# Patient Record
Sex: FEMALE | Race: OTHER | Employment: UNEMPLOYED | URBAN - METROPOLITAN AREA
[De-identification: names, ages, dates, MRNs, and addresses within clinical notes are randomized per-mention and may not be internally consistent; named-entity substitution may affect disease eponyms.]

---

## 2017-11-15 ENCOUNTER — HOSPITAL ENCOUNTER (EMERGENCY)
Age: 42
Discharge: HOME | End: 2017-11-15
Attending: EMERGENCY MEDICINE | Admitting: EMERGENCY MEDICINE
Payer: SELF-PAY

## 2017-11-15 ENCOUNTER — APPOINTMENT (OUTPATIENT)
Dept: GENERAL RADIOLOGY | Age: 42
End: 2017-11-15
Attending: PHYSICIAN ASSISTANT
Payer: SELF-PAY

## 2017-11-15 VITALS
HEART RATE: 87 BPM | WEIGHT: 200 LBS | OXYGEN SATURATION: 98 % | BODY MASS INDEX: 34.15 KG/M2 | SYSTOLIC BLOOD PRESSURE: 110 MMHG | DIASTOLIC BLOOD PRESSURE: 70 MMHG | HEIGHT: 64 IN | RESPIRATION RATE: 18 BRPM | TEMPERATURE: 97.7 F

## 2017-11-15 DIAGNOSIS — J02.9 SORE THROAT: ICD-10-CM

## 2017-11-15 DIAGNOSIS — R05.9 COUGH: Primary | ICD-10-CM

## 2017-11-15 DIAGNOSIS — R07.9 CHEST PAIN, UNSPECIFIED TYPE: ICD-10-CM

## 2017-11-15 LAB
D DIMER PPP FEU-MCNC: 0.47 UG/ML(FEU)
TROPONIN I BLD-MCNC: <0.04 NG/ML (ref 0–0.08)

## 2017-11-15 PROCEDURE — 93005 ELECTROCARDIOGRAM TRACING: CPT

## 2017-11-15 PROCEDURE — 87081 CULTURE SCREEN ONLY: CPT | Performed by: PHYSICIAN ASSISTANT

## 2017-11-15 PROCEDURE — 93971 EXTREMITY STUDY: CPT

## 2017-11-15 PROCEDURE — 99284 EMERGENCY DEPT VISIT MOD MDM: CPT

## 2017-11-15 PROCEDURE — 85379 FIBRIN DEGRADATION QUANT: CPT | Performed by: PHYSICIAN ASSISTANT

## 2017-11-15 PROCEDURE — 71020 XR CHEST PA LAT: CPT

## 2017-11-15 PROCEDURE — 84484 ASSAY OF TROPONIN QUANT: CPT

## 2017-11-15 PROCEDURE — 74011250637 HC RX REV CODE- 250/637: Performed by: PHYSICIAN ASSISTANT

## 2017-11-15 RX ORDER — BENZONATATE 100 MG/1
100 CAPSULE ORAL
Qty: 6 CAP | Refills: 0 | Status: SHIPPED | OUTPATIENT
Start: 2017-11-15 | End: 2017-11-18

## 2017-11-15 RX ORDER — ACETAMINOPHEN 325 MG/1
650 TABLET ORAL
Status: COMPLETED | OUTPATIENT
Start: 2017-11-15 | End: 2017-11-15

## 2017-11-15 RX ADMIN — ACETAMINOPHEN 650 MG: 325 TABLET, FILM COATED ORAL at 16:10

## 2017-11-15 NOTE — ED PROVIDER NOTES
HPI Comments: Marla Christopher is a 38 y.o. Female who speaks [place] as her first language and some English presents to the ED c/o sore throat x 4 days. Pt states the pain is worse with swallowing and worse on the right side. She has taken ibuprofen for the pain. Pt also c/o intermittent cough x 2 weeks. She is also c/o intermittent left sided chest pain x 2 weeks that she gets when she is worried and she has recently been worried about her son; states she last had this pain at 10:00. She states it feels like a \"burning\" and is similar to a pain she had 3 years ago that was evaluated via endoscopy. Pt denies radiation of the pain into the neck or the arm. Pt states she recently saw a Dr. Marilynn Jarrett because she was told he speaks Arabic regarding symptoms and she was told she had three blood clots in her right leg. She denies being started on blood thinners, stating he said she was very anxious and prescribed her medication for anxiety. No hormone use, immobilization, hemoptysis. Pt also reports intermittent frontal headaches, stating she currently has one. It came on gradually and is not the worse headache of her life. The language line was used for obtain history and exam.    Patient is a 38 y.o. female presenting with sore throat and cough. The history is provided by the patient. The history is limited by a language barrier. A  was used. Sore Throat    This is a new problem. The current episode started more than 2 days ago. The problem has not changed since onset. There has been no fever. Associated symptoms include ear pain, headaches and cough. Pertinent negatives include no vomiting. She has tried nothing for the symptoms. Cough   Associated symptoms include chest pain, ear pain, headaches and sore throat. Pertinent negatives include no chills and no vomiting. History reviewed. No pertinent past medical history. History reviewed. No pertinent surgical history. History reviewed. No pertinent family history. Social History     Social History    Marital status: SINGLE     Spouse name: N/A    Number of children: N/A    Years of education: N/A     Occupational History    Not on file. Social History Main Topics    Smoking status: Never Smoker    Smokeless tobacco: Never Used    Alcohol use No    Drug use: No    Sexual activity: Not on file     Other Topics Concern    Not on file     Social History Narrative         ALLERGIES: Review of patient's allergies indicates no known allergies. Review of Systems   Constitutional: Negative for chills and fever. HENT: Positive for ear pain and sore throat. Respiratory: Positive for cough. Cardiovascular: Positive for chest pain. Gastrointestinal: Negative for abdominal pain and vomiting. Genitourinary: Negative for dysuria. Skin: Negative for rash. Neurological: Positive for headaches. Vitals:    11/15/17 1349   BP: 114/70   Pulse: 86   Resp: 18   Temp: 99 °F (37.2 °C)   SpO2: 99%   Weight: 90.7 kg (200 lb)   Height: 5' 4\" (1.626 m)            Physical Exam   Constitutional: She is oriented to person, place, and time. She appears well-developed and well-nourished. No distress. HENT:   Head: Normocephalic and atraumatic. Right Ear: External ear normal.   Left Ear: External ear normal.   Nose: Nose normal.   Mouth/Throat: Oropharynx is clear and moist. No oropharyngeal exudate. Erythema noted to the pharynx. No exudates appreciated. Uvula is midline, airway is patent. Bilateral TMs with good bony landmarks, no erythema or bulging appreciated. Eyes: Conjunctivae are normal.   Neck: Normal range of motion. Neck supple. Cardiovascular: Normal rate, regular rhythm and normal heart sounds. Pulmonary/Chest: Effort normal and breath sounds normal. No respiratory distress. She has no wheezes. She has no rales. Musculoskeletal:   LLE: mild calf ttp. No edema or skin changes noted.  Good DP pulse.  RLE: mild anterior shin ttp, no calf ttp. No edema or skin changes. Good DP pulse. BLE are symmetric in size. Lymphadenopathy:     She has no cervical adenopathy. Neurological: She is alert and oriented to person, place, and time. Skin: Skin is warm and dry. She is not diaphoretic. Psychiatric: She has a normal mood and affect. Nursing note and vitals reviewed. MDM  Number of Diagnoses or Management Options  Chest pain, unspecified type:   Cough:   Sore throat:   Diagnosis management comments: Differential Diagnosis: Pneumonia, pulmonary embolism, chest wall pain, angina, AMI, pleurisy, pericarditis, anxiety, viral URI, pharyngitis, strep pharyngitis    Pt c/o intermittent CP, last had at 10am today, described as a burning sensation that occurs when she is worried. No radiation of pain. It sounds as though it has previously been worked up by GI with endo. Does not sound cardiac in nature, trop is negative and onset was greater than 6 hours ago, therefore a second is not necessary. EKG NSR, no STEMI. CXR negative for acute cardiopulmonary process. Pt is reporting she was told 2 weeks ago she had blood clots in her legs by a Dr. Sohan Her. I am unable to see any records on this through her chart or CareEverywhere. She was not placed on thinners but rather prescribed anxiety medication. Clinically she does not have signs of a DVT, however, PVL ordered as the story does not seem to be adding up. With questionable report of DVT (PVL pending) and CP, d-dimer was ordered. Slightly elevated above our limits at 0.47, however, less than 0.5 is negative. Well's Criteria for PE score puts patient at a low pre-test probability. The only factor contributing to score is report of DVT which at this time is not confirmed. 5:10 PM Pt reassessed, she is resting comfortably. States headache has resolved with Tylenol. Informed we will be getting PVL.  She states she has never had imaging of her LE prior to being told she had blood clots. 6:24 PM Pt reassessed, she is resting comfortably on cell phone. Still waiting for PVL. [place] spoke with Marielena Renee, vascular tech, states she should be in soon to do PVL. 6:27 PM tech is here to take patient for PVL. 7:00 PM Pt returned from PVL study. Negative per tech. Given this information in light of the rest of her story, feel patient is stable for discharge with outpatient PCP follow up and cardiology evaluation for chest pain. Suspect patient has a URI explaining pharyngitis and cough. Will rx tessalon perles for cough and symptomatic control at this point.         Amount and/or Complexity of Data Reviewed  Clinical lab tests: ordered and reviewed  Tests in the radiology section of CPT®: ordered and reviewed      ED Course     RESULTS:    DUPLEX LOWER EXT VENOUS RIGHT         XR CHEST PA LAT   Final Result          Labs Reviewed   D DIMER - Abnormal; Notable for the following:        Result Value    D DIMER 0.47 (*)     All other components within normal limits   STREP THROAT SCREEN   POC TROPONIN-I   POC TROPONIN-I       Recent Results (from the past 12 hour(s))   EKG, 12 LEAD, INITIAL    Collection Time: 11/15/17  2:56 PM   Result Value Ref Range    Ventricular Rate 83 BPM    Atrial Rate 83 BPM    P-R Interval 176 ms    QRS Duration 82 ms    Q-T Interval 368 ms    QTC Calculation (Bezet) 432 ms    Calculated P Axis 13 degrees    Calculated R Axis -2 degrees    Calculated T Axis 46 degrees    Diagnosis       Normal sinus rhythm  Inferior infarct , age undetermined  Abnormal ECG  No previous ECGs available     STREP THROAT SCREEN    Collection Time: 11/15/17  3:00 PM   Result Value Ref Range    Special Requests: NO SPECIAL REQUESTS      Strep Screen NEGATIVE       Culture result: PENDING    D DIMER    Collection Time: 11/15/17  3:15 PM   Result Value Ref Range    D DIMER 0.47 (H) <0.46 ug/ml(FEU)   POC TROPONIN-I    Collection Time: 11/15/17  3:22 PM Result Value Ref Range    Troponin-I (POC) <0.04 0.00 - 0.08 ng/mL       Procedures  PVL study. Diagnosis:   1. Cough    2. Sore throat    3. Chest pain, unspecified type          Disposition: Discharge. Follow-up Information     Follow up With Details Comments [address] MD Schedule an appointment as soon as possible for a visit in 1 day for evaluation of cough and further evaluation chest pain [id number] [facility] 281 222 [facility] EMERGENCY DEPT  As needed, If symptoms worsen [address] [facility] [place] Harvey HARRINGTON MD Schedule an appointment as soon as possible for a visit in 1 day for further evaluation of chest pain [address]  301 [facility] [address]  [address] [facility]  [phone]            Patient's Medications   Start Taking    BENZONATATE (TESSALON PERLES) 100 MG CAPSULE    Take 1 Cap by mouth two (2) times daily as needed for Cough for up to 3 days. Continue Taking    No medications on file   These Medications have changed    No medications on file   Stop Taking    CITALOPRAM (CELEXA) 10 MG TABLET    Take 1 Tab by mouth daily. LORATADINE (CLARITIN) 10 MG TABLET    Take 1 Tab by mouth daily. OMEPRAZOLE DELAYED RELEASE (PRILOSEC D/R) 20 MG TABLET    Take 1 Tab by mouth two (2) times a day. TRIAMCINOLONE ACETONIDE (KENALOG) 0.1 % OINTMENT    Apply  to affected area two (2) times a day.  use thin layer     Landry Arnett PA-C 7:01 PM

## 2017-11-15 NOTE — DISCHARGE INSTRUCTIONS
Tos: Instrucciones de cuidado - [ Cough: Care Instructions ]  Instrucciones de cuidado    La tos es [place] de parker cuerpo a algo que molesta en la garganta o las vías respiratorias. La tos puede ser provocada por muchas cosas. Usted podría toser debido a un resfriado o nahed gripe, nahed bronquitis o el asma. Fumar, el goteo posnasal, las alergias y el ácido estomacal que regresa a parker garganta también pueden causar tos. La tos es un síntoma, no nahed enfermedad. En la [employer], la tos cesa cuando desaparece la causa, leon un resfriado. Puede jess algunas medidas en parker hogar para toser menos y sentirse mejor. La atención de seguimiento es nahed parte clave de parker tratamiento y seguridad. Asegúrese de hacer y acudir a todas las citas, y llame a parker médico si está teniendo problemas. También es nahed buena idea saber los resultados de los exámenes y mantener nahed lista de los medicamentos que dilip. ¿Cómo puede cuidarse en el hogar? · [place] abundante agua y otros líquidos. [place] ayuda a [place] mucosidad sea menos espesa y [place] la garganta seca o adolorida. La miel o el jugo de gladys en [place] o té podrían aliviar nahed tos seca. · [employer] medicamentos para la tos según las indicaciones de parker médico.  · Eleve la ashleigh sobre almohadas para ayudarle a respirar y aliviar la tos seca. · Pruebe pastillas para la tos para aliviar la garganta seca o adolorida. Las pastillas para la tos no detienen la tos. Las pastillas para la tos medicinales saborizadas no son mejores que las pastillas con sabor a [place] o los caramelos duros. · No fume. Evite el humo de tabaco ambiental. Si necesita ayuda para dejar de fumar, hable con parker médico sobre programas y medicamentos para dejar de fumar. Estos pueden aumentar rahel probabilidades de dejar el hábito para siempre. ¿Cuándo debe pedir ayuda? Llame al 911 en cualquier momento que considere que necesita atención de [place]. Por ejemplo, llame si:  ?  · Tiene graves dificultades para respirar. ? Llame a parker médico ahora mismo o busque atención médica inmediata si:  ? · Tose denys. ? · Tiene nuevas dificultades para respirar o Avanell Edman. ? · Tiene fiebre nueva o más amrita. ? · Tiene un salpullido nuevo. ?Preste especial atención a los cambios en parker robyn y asegúrese de comunicarse con parker médico si:  ? · Parker tos es más profunda o más frecuente que antes, especialmente si nota más mucosidad o un cambio en el color de la mucosidad. ? · Tiene nuevos síntomas, leon dolor de garganta, dolor de oídos o dolor en los senos paranasales. ? · No mejora leon se esperaba. ¿Dónde puede encontrar más información en inglés? Chi Safer a http://zabrina-noble.info/. Escriba D279 en la búsqueda para aprender más acerca de \"Tos: Instrucciones de cuidado - [ Cough: Care Instructions ]. \"  Revisado: 12 seymour, 2017  Versión del contenido: 11.4  © 1637-5973 Healthwise, Incorporated. Las instrucciones de cuidado fueron adaptadas bajo licencia por [employer] (which disclaims liability or warranty for this information). Si usted tiene [place] afección médica o sobre estas instrucciones, siempre pregunte a parker profesional de robyn. Healthwise, Incorporated niega toda garantía o responsabilidad por parker uso de esta información. Dolor de pecho: Instrucciones de cuidado - [ Chest Pain: Care Instructions ]  Instrucciones de cuidado    El dolor de pecho puede tener muchas causas. Algunas no son graves y mejorarán por sí solas en pocos días. Aly algunos tipos de dolor de pecho requieren más pruebas y [place]. Es posible que parker médico le haya recomendado nahed visita de seguimiento dentro de las 8 a 12 horas siguientes. Si no mejora, es posible que necesite [address] pruebas o [place]. Aunque parker médico le haya dado de amrita, es necesario que esté atento a cualquier problema que se presente.  El médico le hizo un cuidadoso [place], [place] a veces los problemas pueden aparecer posteriormente. Si tiene nuevos síntomas o éstos no mejoran, obtenga atención médica de inmediato. Si tiene dolor o presión en el pecho que empeora o es diferente y que dura más de 5 minutos, o se desmayó (perdió el conocimiento), llame al 911 o busque otra ayuda de emergencia de inmediato. Acudir a nahed consulta médica es sólo un paso en parker tratamiento. Aunque se sienta mejor, todavía deberá hacer lo que parker médico le recomiende, leon asistir a todas las visitas de seguimiento sugeridas y jess los medicamentos exactamente [employer]	[place] fueron indicados. [place] le ayudará a recuperarse y prevenir problemas futuros. ¿Cómo puede cuidarse en el hogar? · Descanse hasta que se sienta mejor. · Soham rahel medicamentos exactamente leon le fueron recetados. Llame a parker médico si michela estar teniendo problemas con parker medicamento. · No conduzca después de jess un analgésico (medicamento para el dolor) recetado. ¿Cuándo debe pedir ayuda? Llame al 911 si:  ? · Se desmayó (perdió el conocimiento). ? · Tiene graves dificultades para respirar. ? · Tiene síntomas de un ataque al corazón. Estos podrían incluir:  ¨ Dolor o presión en el pecho, o nahed sensación extraña en el pecho. ¨ Sudoración. ¨ Falta de aire. ¨ Náuseas o vómito. ¨ Dolor, presión o nahed sensación extraña en la espalda, el mayco, la mandíbula, la parte superior del abdomen o en yelena o ambos hombros o brazos. ¨ Aturdimiento o debilidad repentina. ¨ Latidos del corazón rápidos o irregulares. Después de llamar al 911, es posible que el operador le diga que mastique 1 aspirina para adultos o de 2 a 4 aspirinas de dosis baja. Espere a nahed ambulancia. No intente conducir usted mismo. ? Llame hoy a parker médico si:  ? · Tiene cualquier dificultad para respirar. ? · El dolor en el pecho empeora. ? · [employer] o aturdimiento, o que está a punto de [place]. ? · No mejora leon se esperaba.    ? · Tiene dolor de pecho nuevo o diferente. ¿Dónde puede encontrar más información en inglés? Donold Noss a http://zabrina-noble.info/. Escriba A120 en la búsqueda para aprender más acerca de \"Dolor de pecho: Instrucciones de cuidado - [ Chest Pain: Care Instructions ]. \"  Revisado: 20 [address] 2017  Versión del contenido: 11.4  © 1114-6567 Healthwise, Incorporated. Las instrucciones de cuidado fueron adaptadas bajo licencia por [employer] (which disclaims liability or warranty for this information). Si usted tiene [place] afección médica o sobre estas instrucciones, siempre pregunte a parker profesional de robyn. Healthwise, Incorporated niega toda garantía o responsabilidad por parker uso de esta información.

## 2017-11-16 LAB
ATRIAL RATE: 83 BPM
CALCULATED P AXIS, ECG09: 13 DEGREES
CALCULATED R AXIS, ECG10: -2 DEGREES
CALCULATED T AXIS, ECG11: 46 DEGREES
DIAGNOSIS, 93000: NORMAL
P-R INTERVAL, ECG05: 176 MS
Q-T INTERVAL, ECG07: 368 MS
QRS DURATION, ECG06: 82 MS
QTC CALCULATION (BEZET), ECG08: 432 MS
VENTRICULAR RATE, ECG03: 83 BPM

## 2017-11-16 NOTE — PROCEDURES
[facility]  *** FINAL REPORT ***    Name: Pat Garcia  MRN: [id number]    Inpatient  : 28 Oct 1975  HIS Order #: [id number]  [address] [facility] Visit #: [id number]  Date: 15 Nov 2017    TYPE OF TEST: Peripheral Venous Testing    REASON FOR TEST  Pain in limb    Right Leg:-  Deep venous thrombosis:           No  Superficial venous thrombosis:    No  Deep venous insufficiency:        Not examined  Superficial venous insufficiency: Not examined      INTERPRETATION/FINDINGS  Duplex images were obtained using 2-D gray scale, color flow, and  spectral Doppler analysis. Right leg :  1. Deep vein(s) visualized include the common femoral, deep femoral,  proximal femoral, mid femoral, distal femoral, popliteal(above knee),  popliteal(fossa), popliteal(below knee), posterior tibial and peroneal   veins. 2. No evidence of deep venous thrombosis detected in the veins  visualized. 3. No evidence of deep vein thrombosis in the contralateral common  femoral vein. 4. Superficial vein(s) visualized include the great saphenous and  small saphenous veins. 5. No evidence of superficial thrombosis detected. ADDITIONAL COMMENTS  Results given to [facility], [place]    I have personally reviewed the data relevant to the interpretation of  this  study.     TECHNOLOGIST: Lashanda Putnam RDMS, RVT  Signed: 11/15/2017 07:03 PM    PHYSICIAN: Deisi Claire MD  Signed: 2017 08:48 AM

## 2017-11-17 LAB
B-HEM STREP THROAT QL CULT: NEGATIVE
BACTERIA SPEC CULT: NORMAL
SERVICE CMNT-IMP: NORMAL

## 2017-12-20 ENCOUNTER — DOCUMENTATION ONLY (OUTPATIENT)
Dept: FAMILY MEDICINE CLINIC | Age: 42
End: 2017-12-20
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
## 2019-05-31 PROCEDURE — 99284 EMERGENCY DEPT VISIT MOD MDM: CPT

## 2019-06-01 ENCOUNTER — APPOINTMENT (OUTPATIENT)
Dept: CT IMAGING | Age: 44
End: 2019-06-01
Attending: EMERGENCY MEDICINE
Payer: SELF-PAY

## 2019-06-01 ENCOUNTER — HOSPITAL ENCOUNTER (EMERGENCY)
Age: 44
Discharge: HOME | End: 2019-06-01
Attending: EMERGENCY MEDICINE
Payer: SELF-PAY

## 2019-06-01 VITALS
OXYGEN SATURATION: 98 % | HEART RATE: 89 BPM | WEIGHT: 219 LBS | DIASTOLIC BLOOD PRESSURE: 68 MMHG | SYSTOLIC BLOOD PRESSURE: 107 MMHG | TEMPERATURE: 98.8 F | RESPIRATION RATE: 16 BRPM | BODY MASS INDEX: 37.59 KG/M2

## 2019-06-01 DIAGNOSIS — K52.9 ENTERITIS: Primary | ICD-10-CM

## 2019-06-01 LAB
ALBUMIN SERPL-MCNC: 3.9 G/DL (ref 3.4–5)
ALBUMIN/GLOB SERPL: 1 {RATIO} (ref 0.8–1.7)
ALP SERPL-CCNC: 140 U/L (ref 45–117)
ALT SERPL-CCNC: 40 U/L (ref 13–56)
ANION GAP SERPL CALC-SCNC: 7 MMOL/L (ref 3–18)
APPEARANCE UR: ABNORMAL
AST SERPL-CCNC: 24 U/L (ref 15–37)
BACTERIA URNS QL MICRO: ABNORMAL /HPF
BASOPHILS # BLD: 0 K/UL (ref 0–0.1)
BASOPHILS NFR BLD: 0 % (ref 0–2)
BILIRUB SERPL-MCNC: 0.5 MG/DL (ref 0.2–1)
BILIRUB UR QL: ABNORMAL
BUN SERPL-MCNC: 14 MG/DL (ref 7–18)
BUN/CREAT SERPL: 17 (ref 12–20)
CALCIUM SERPL-MCNC: 8.4 MG/DL (ref 8.5–10.1)
CHLORIDE SERPL-SCNC: 103 MMOL/L (ref 100–108)
CO2 SERPL-SCNC: 27 MMOL/L (ref 21–32)
COLOR UR: ABNORMAL
CREAT SERPL-MCNC: 0.82 MG/DL (ref 0.6–1.3)
DIFFERENTIAL METHOD BLD: ABNORMAL
EOSINOPHIL # BLD: 0.4 K/UL (ref 0–0.4)
EOSINOPHIL NFR BLD: 3 % (ref 0–5)
EPITH CASTS URNS QL MICRO: ABNORMAL /LPF (ref 0–5)
ERYTHROCYTE [DISTWIDTH] IN BLOOD BY AUTOMATED COUNT: 12.8 % (ref 11.6–14.5)
GLOBULIN SER CALC-MCNC: 3.8 G/DL (ref 2–4)
GLUCOSE SERPL-MCNC: 128 MG/DL (ref 74–99)
GLUCOSE UR STRIP.AUTO-MCNC: NEGATIVE MG/DL
HCT VFR BLD AUTO: 41.4 % (ref 35–45)
HGB BLD-MCNC: 13.3 G/DL (ref 12–16)
HGB UR QL STRIP: ABNORMAL
KETONES UR QL STRIP.AUTO: ABNORMAL MG/DL
LEUKOCYTE ESTERASE UR QL STRIP.AUTO: ABNORMAL
LIPASE SERPL-CCNC: 104 U/L (ref 73–393)
LYMPHOCYTES # BLD: 2 K/UL (ref 0.9–3.6)
LYMPHOCYTES NFR BLD: 16 % (ref 21–52)
MCH RBC QN AUTO: 25.7 PG (ref 24–34)
MCHC RBC AUTO-ENTMCNC: 32.1 G/DL (ref 31–37)
MCV RBC AUTO: 80.1 FL (ref 74–97)
MONOCYTES # BLD: 1 K/UL (ref 0.05–1.2)
MONOCYTES NFR BLD: 8 % (ref 3–10)
NEUTS SEG # BLD: 9.4 K/UL (ref 1.8–8)
NEUTS SEG NFR BLD: 73 % (ref 40–73)
NITRITE UR QL STRIP.AUTO: NEGATIVE
PH UR STRIP: 5.5 [PH] (ref 5–8)
PLATELET # BLD AUTO: 291 K/UL (ref 135–420)
PMV BLD AUTO: 10.1 FL (ref 9.2–11.8)
POTASSIUM SERPL-SCNC: 3.8 MMOL/L (ref 3.5–5.5)
PROT SERPL-MCNC: 7.7 G/DL (ref 6.4–8.2)
PROT UR STRIP-MCNC: 30 MG/DL
RBC # BLD AUTO: 5.17 M/UL (ref 4.2–5.3)
RBC #/AREA URNS HPF: ABNORMAL /HPF (ref 0–5)
SODIUM SERPL-SCNC: 137 MMOL/L (ref 136–145)
SP GR UR REFRACTOMETRY: 1.02 (ref 1–1.03)
UROBILINOGEN UR QL STRIP.AUTO: 1 EU/DL (ref 0.2–1)
WBC # BLD AUTO: 12.7 K/UL (ref 4.6–13.2)
WBC URNS QL MICRO: ABNORMAL /HPF (ref 0–4)

## 2019-06-01 PROCEDURE — 85025 COMPLETE CBC W/AUTO DIFF WBC: CPT

## 2019-06-01 PROCEDURE — 74011636320 HC RX REV CODE- 636/320: Performed by: EMERGENCY MEDICINE

## 2019-06-01 PROCEDURE — 96374 THER/PROPH/DIAG INJ IV PUSH: CPT

## 2019-06-01 PROCEDURE — 81001 URINALYSIS AUTO W/SCOPE: CPT

## 2019-06-01 PROCEDURE — 74011250636 HC RX REV CODE- 250/636: Performed by: EMERGENCY MEDICINE

## 2019-06-01 PROCEDURE — 74177 CT ABD & PELVIS W/CONTRAST: CPT

## 2019-06-01 PROCEDURE — 83690 ASSAY OF LIPASE: CPT

## 2019-06-01 PROCEDURE — 93005 ELECTROCARDIOGRAM TRACING: CPT

## 2019-06-01 PROCEDURE — 74011250637 HC RX REV CODE- 250/637: Performed by: EMERGENCY MEDICINE

## 2019-06-01 PROCEDURE — 80053 COMPREHEN METABOLIC PANEL: CPT

## 2019-06-01 PROCEDURE — 96375 TX/PRO/DX INJ NEW DRUG ADDON: CPT

## 2019-06-01 PROCEDURE — 96361 HYDRATE IV INFUSION ADD-ON: CPT

## 2019-06-01 RX ORDER — DICYCLOMINE HYDROCHLORIDE 20 MG/1
20 TABLET ORAL EVERY 6 HOURS
Qty: 20 TAB | Refills: 0 | Status: SHIPPED | OUTPATIENT
Start: 2019-06-01 | End: 2019-06-06

## 2019-06-01 RX ORDER — ONDANSETRON 4 MG/1
4 TABLET, FILM COATED ORAL
Qty: 12 TAB | Refills: 0 | Status: SHIPPED | OUTPATIENT
Start: 2019-06-01 | End: 2020-03-14

## 2019-06-01 RX ORDER — MORPHINE SULFATE 4 MG/ML
2 INJECTION INTRAVENOUS ONCE
Status: COMPLETED | OUTPATIENT
Start: 2019-06-01 | End: 2019-06-01

## 2019-06-01 RX ORDER — DICYCLOMINE HYDROCHLORIDE 10 MG/1
20 CAPSULE ORAL
Status: COMPLETED | OUTPATIENT
Start: 2019-06-01 | End: 2019-06-01

## 2019-06-01 RX ORDER — ONDANSETRON 2 MG/ML
4 INJECTION INTRAMUSCULAR; INTRAVENOUS
Status: COMPLETED | OUTPATIENT
Start: 2019-06-01 | End: 2019-06-01

## 2019-06-01 RX ORDER — MORPHINE SULFATE 2 MG/ML
2 INJECTION, SOLUTION INTRAMUSCULAR; INTRAVENOUS
Status: DISCONTINUED | OUTPATIENT
Start: 2019-06-01 | End: 2019-06-01 | Stop reason: CLARIF

## 2019-06-01 RX ADMIN — SODIUM CHLORIDE 1000 ML: 900 INJECTION, SOLUTION INTRAVENOUS at 02:19

## 2019-06-01 RX ADMIN — DICYCLOMINE HYDROCHLORIDE 20 MG: 10 CAPSULE ORAL at 02:17

## 2019-06-01 RX ADMIN — IOPAMIDOL 100 ML: 612 INJECTION, SOLUTION INTRAVENOUS at 02:28

## 2019-06-01 RX ADMIN — MORPHINE SULFATE 2 MG: 4 INJECTION INTRAVENOUS at 02:17

## 2019-06-01 RX ADMIN — ONDANSETRON 4 MG: 2 INJECTION INTRAMUSCULAR; INTRAVENOUS at 02:16

## 2019-06-01 NOTE — ED PROVIDER NOTES
EMERGENCY DEPARTMENT HISTORY AND PHYSICAL EXAM    Date: 6/1/2019  Patient Name: Shaka Christopher    History of Presenting Illness     Chief Complaint   Patient presents with    Abdominal Pain         History Provided By: Patient    Additional History (Context):   1:41 AM  Marla Christopher is a 37 y.o. female with no known PMHX who presents to the emergency department C/O abdominal pain onset 2 days ago with accompanying symptoms of nausea, vomiting, and diarrea. Her pain is located at the epigastric area and left side and extends into her back and is constant and burning in character. Symptoms started with vomiting and then pain came on. She feels like she has been having diarrhea \"every five minutes\". She has not history of abdominal surgery except for tubal ligation. The patient presents no further medical complaints or concerns to the ED at this time. PCP: None        Past History     Past Medical History:  None    Past Surgical History:  Past Surgical History:   Procedure Laterality Date    HX TUBAL LIGATION         Family History:  None    Social History:  Social History     Tobacco Use    Smoking status: Never Smoker    Smokeless tobacco: Never Used   Substance Use Topics    Alcohol use: No    Drug use: No       Allergies:  No Known Allergies      Review of Systems   Review of Systems   Constitutional: Positive for chills and fever. Negative for diaphoresis and unexpected weight change. HENT: Negative for congestion, drooling, ear pain, rhinorrhea, sore throat, tinnitus and trouble swallowing. Eyes: Negative for photophobia, pain, redness and visual disturbance. Respiratory: Positive for cough. Negative for choking, chest tightness, shortness of breath, wheezing and stridor. Cardiovascular: Negative for chest pain, palpitations and leg swelling. Gastrointestinal: Positive for abdominal distention, abdominal pain, diarrhea, nausea and vomiting.  Negative for anal bleeding, blood in stool and constipation. Endocrine: Negative for cold intolerance, heat intolerance, polydipsia and polyuria. Genitourinary: Positive for dysuria. Negative for difficulty urinating, flank pain, frequency, hematuria and urgency. Musculoskeletal: Positive for back pain. Negative for arthralgias and neck pain. Skin: Negative for color change, rash and wound. Allergic/Immunologic: Negative for immunocompromised state. Neurological: Positive for headaches. Negative for dizziness, seizures, syncope, speech difficulty and light-headedness. Hematological: Does not bruise/bleed easily. Psychiatric/Behavioral: Negative for agitation, behavioral problems, hallucinations, self-injury and suicidal ideas. The patient is not hyperactive. Physical Exam     Vitals:    06/01/19 0002 06/01/19 0443   BP: 101/62 107/68   Pulse: (!) 106 89   Resp: 15 16   Temp: 98.8 °F (37.1 °C)    SpO2: 98% 98%   Weight: 99.3 kg (219 lb)      Physical Exam   Constitutional: She is oriented to person, place, and time. She appears well-developed and well-nourished. Non-toxic appearance. No distress. Appearing comfortable and well appearing. She is obese. HENT:   Head: Normocephalic and atraumatic. Right Ear: External ear normal.   Left Ear: External ear normal.   Mouth/Throat: Oropharynx is clear and moist. No oropharyngeal exudate. Eyes: Pupils are equal, round, and reactive to light. Conjunctivae and EOM are normal. No scleral icterus. Neck: Normal range of motion. Neck supple. No JVD present. No tracheal deviation present. No thyromegaly present. Cardiovascular: Normal rate, regular rhythm and normal heart sounds. Pulmonary/Chest: Effort normal and breath sounds normal. No stridor. No respiratory distress. Abdominal: Soft. She exhibits no distension. Bowel sounds are decreased. There is tenderness in the right upper quadrant and right lower quadrant. There is guarding (voluntary). There is no rebound.    She is able to straighten her torso without discomfort. Her abdomen appears protuberant. Tympany is heard throughout. No peritoneal signs. Musculoskeletal: Normal range of motion. She exhibits no edema or tenderness. Lymphadenopathy:     She has no cervical adenopathy. Neurological: She is alert and oriented to person, place, and time. She has normal reflexes. No cranial nerve deficit. Coordination normal.   Skin: Skin is warm and dry. No rash noted. She is not diaphoretic. No erythema. Psychiatric: She has a normal mood and affect. Her behavior is normal. Judgment and thought content normal.   Nursing note and vitals reviewed. Diagnostic Study Results     Labs -     No results found for this or any previous visit (from the past 12 hour(s)). Radiologic Studies -   CT ABD PELV W CONT   Final Result   IMPRESSION:      No evidence of bowel obstruction or acute inflammatory process in the abdomen or   pelvis. Note: Preliminary report sent to the Emergency Department by the radiology   resident at the time of the study. CT Results  (Last 48 hours)               06/01/19 0227  CT ABD PELV W CONT Final result    Impression:  IMPRESSION:       No evidence of bowel obstruction or acute inflammatory process in the abdomen or   pelvis. Note: Preliminary report sent to the Emergency Department by the radiology   resident at the time of the study. Narrative:  EXAM: CT of the abdomen and pelvis       CLINICAL INDICATION/HISTORY:  Right lower quadrant abdominal pain. COMPARISON: None. TECHNIQUE: Axial CT imaging of the abdomen and pelvis was performed with   intravenous contrast. Multiplanar reformats were generated. One or more dose reduction techniques were used on this CT: automated exposure   control, adjustment of the mAs and/or kVp according to patient size, and   iterative reconstruction techniques.   The specific techniques used on this CT   exam have been documented in the patient's electronic medical record. Digital   Imaging and Communications in Medicine (DICOM) format image data are available   to nonaffiliated external healthcare facilities or entities on a secure, media   free, reciprocally searchable basis with patient authorization for at least a   12-month period after this study. _______________       FINDINGS:       LOWER CHEST: The visualized lung bases are clear. Heart size is normal. There is   no pericardial or pleural effusion. LIVER, BILIARY: Liver is normal with no focal parenchymal lesion identified. There is no intra-or extrahepatic biliary ductal dilatation. Gallbladder is   unremarkable. PANCREAS: Normal.       SPLEEN: Normal.       ADRENALS: Normal.       KIDNEYS: Normal. There is no nephrolithiasis. There is no hydronephrosis. LYMPH NODES: No enlarged lymph nodes. GASTROINTESTINAL TRACT: No bowel dilation or wall thickening. The appendix is   visualized and unremarkable. PELVIC ORGANS: Uterus is retroflexed. VASCULATURE: Unremarkable. BONES: No acute or aggressive osseous abnormalities identified. OTHER: There is no free intraperitoneal fluid or free air. SUPERFICIAL SOFT TISSUES: Unremarkable.       _______________               CXR Results  (Last 48 hours)    None          Medications given in the ED-  Medications   sodium chloride 0.9 % bolus infusion 1,000 mL (0 mL IntraVENous IV Completed 6/1/19 0319)   dicyclomine (BENTYL) capsule 20 mg (20 mg Oral Given 6/1/19 0217)   ondansetron (ZOFRAN) injection 4 mg (4 mg IntraVENous Given 6/1/19 0216)   morphine injection 2 mg (2 mg IntraVENous Given 6/1/19 0217)   iopamidol (ISOVUE 300) 61 % contrast injection 100 mL (100 mL IntraVENous Given 6/1/19 0228)         Medical Decision Making   I am the first provider for this patient.     I reviewed the vital signs, available nursing notes, past medical history, past surgical history, family history and social history. Vital Signs-Reviewed the patient's vital signs. Pulse Oximetry Analysis - 98% on RA     Cardiac Monitor:  Rate: 106 bpm  Rhythm: NSR    Records Reviewed: NURSING NOTES AND PREVIOUS MEDICAL RECORDS    Provider Notes (Medical Decision Making): DDx includes possible gallbladder or biliary disease followed by kidney disease. More likely gastroenteritis, nonspecific viral illness, or even Crohn's disease. Unlikely vascular or cardiopulmomary event. Procedures:  Procedures    ED Course:   1:41 AM: Initial assessment performed. The patients presenting problems have been discussed, and they are in agreement with the care plan formulated and outlined with them. I have encouraged them to ask questions as they arise throughout their visit. Diagnosis and Disposition       DISCHARGE NOTE:    Marla Christopher's  results have been reviewed with her. She has been counseled regarding her diagnosis, treatment, and plan. She verbally conveys understanding and agreement of the signs, symptoms, diagnosis, treatment and prognosis and additionally agrees to follow up as discussed. She also agrees with the care-plan and conveys that all of her questions have been answered. I have also provided discharge instructions for her that include: educational information regarding their diagnosis and treatment, and list of reasons why they would want to return to the ED prior to their follow-up appointment, should her condition change. She has been provided with education for proper emergency department utilization. CLINICAL IMPRESSION:    1. Enteritis        PLAN:  1. D/C Home  2. Discharge Medication List as of 6/1/2019  4:29 AM        3.    Follow-up Information     Follow up With Specialties Details Why Contact [facility] EMERGENCY DEPT Emergency Medicine  As needed [address] [place] [address]    Angie Law MD Family Practice In 1 week  [address] [place] [address] [address]  [phone]          _______________________________    This note was partially transcribed via voice recognition software. Although efforts have been made to catch any discrepancies, it may contain sound alike words, grammatical errors, or nonsensical words. Scribe Attestation     James Gutierrez acting as a scribe for and in the presence of Latasha Alvarez MD      June 01, 2019 at 1:41 AM       Provider Attestation:      I personally performed the services described in the documentation, reviewed the documentation, as recorded by the scribe in my presence, and it accurately and completely records my words and actions.  June 01, 2019 at 1:41 AM - Latasha Alvarez MD

## 2019-06-01 NOTE — DISCHARGE INSTRUCTIONS
Patient Education        Gastroenteritis: Instrucciones de cuidado - [ Gastroenteritis: Care Instructions ]  Instrucciones de cuidado    La gastroenteritis es nahed enfermedad que puede causar náuseas, vómito y [place]. A veces se la llama \"gastroenteritis viral\". Puede ser causada por nahed bacteria o un virus. Es probable que comience a sentirse mejor en 1 a 2 días. Entretanto, descanse mucho y asegúrese de no deshidratarse. La deshidratación ocurre cuando parker cuerpo pierde demasiado líquido. La atención de seguimiento es nahed parte clave de parker tratamiento y seguridad. Asegúrese de hacer y acudir a todas las citas, y llame a parker médico si está teniendo problemas. También es nahed buena idea saber los resultados de rahel exámenes y mantener nahed lista de los medicamentos que dilip. ¿Cómo puede cuidarse en el hogar? · Si parker médico le recetó antibióticos, tómelos según las indicaciones. No deje de tomarlos por el hecho de sentirse mejor. Debe jess todos los antibióticos hasta terminarlos. · Laney abundantes líquidos para prevenir la deshidratación, lo suficiente para que parker orina sea de color amarillo joni o transparente leon el agua. Elija beber agua y otros líquidos dolores sin cafeína hasta que se sienta mejor. Si tiene nahed enfermedad del riñón, del corazón o del hígado y tiene que Radha's líquidos, hable con parker médico antes de aumentar parker consumo. · Laney los líquidos lentamente, en cantidades pequeñas y frecuentes, ya que beber [employer] muy rápido le puede causar vómito. · Empiece a comer alimentos suaves, leon pan rodolfo seco, yogur, arroz, bananas (plátanos) y puré de Synchari. Evite los alimentos condimentados, picantes o con gran contenido de [place] y no laney alcohol ni cafeína padmini yelena o [address]. No laney leche ni coma helado hasta que se sienta mejor. Cómo prevenir la gastroenteritis  · [address] comidas calientes, calientes y las frías, frías.   · No consuma malissa, aderezos, ensaladas u otros alimentos que hayan permanecido a temperatura ambiente padmini más de 2 horas. · Utilice un termómetro para verificar la temperatura de parker refrigerador (nevera). La temperatura debería estar entre 34°F y 40°F (1°C y 4°C). · Descongele la carne en el refrigerador o el microondas, no sobre la encimera. · [address] y la cocina limpias. Lávese las [place], las tablas de picar y las encimeras con frecuencia, con agua jabonosa caliente. · Cocine la carne hasta que esté jerrell cocinada. · No coma huevos crudos ni salsas no cocidas hechas con huevos crudos. · No corra riesgos. Si la comida sabe o parece echada a perder, deséchela. ¿Cuándo debe pedir ayuda? Llame al 911 en cualquier momento que considere que necesita atención de emergencia. Por ejemplo, llame si:    · Vomita denys o algo parecido a granos de café molido.     · Se desmayó (perdió el conocimiento).   · Las heces son de color rojizo o muy sanguinolentas (con denys).    Llame a parker médico ahora mismo o busque atención médica inmediata si:    · Tiene dolor intenso en el vientre.     · Tiene señales de necesitar más líquidos. Tiene los ojos hundidos, la boca seca y poca orina de color oscuro.     · Siente leon si fuera a desmayarse.     · Tiene mayor dolor abdominal que no desaparece en 1 a 2 días.     · Tiene nuevas náuseas o éstas aumentan, o tiene vómito.     · Tiene fiebre nueva o más amrita.     · Cortney heces son negruzcas y parecidas al alquitrán o tienen rastros de denys.    Preste especial atención a los cambios en parker robyn y asegúrese de comunicarse con parker médico si:    · Se siente mareado o aturdido.     · Orina menos de lo habitual o parker orina es de color amarillento oscuro o amarronado.     · No se siente mejor con cada día que pasa. ¿Dónde puede encontrar más información en inglés? Amber thomas [email or web address]-noble.[email or web address]   Escriba N142 en la búsqueda para aprender más acerca de \"Gastroenteritis: Instrucciones de cuidado - [ Gastroenteritis: Care Instructions ]. \"  Revisado: 30 julio, 2018  Versión del contenido: 11.9  © 0289-3064 Healthwise, Incorporated. Las instrucciones de cuidado fueron adaptadas bajo licencia por [employer] (which disclaims liability or warranty for this information). Si usted tiene [place] afección médica o sobre estas instrucciones, siempre pregunte a parker profesional de robyn. Healthwise, Incorporated niega toda garantía o responsabilidad por parker uso de esta información.

## 2019-06-02 LAB
ATRIAL RATE: 92 BPM
CALCULATED P AXIS, ECG09: 28 DEGREES
CALCULATED R AXIS, ECG10: -28 DEGREES
CALCULATED T AXIS, ECG11: 21 DEGREES
DIAGNOSIS, 93000: NORMAL
P-R INTERVAL, ECG05: 164 MS
Q-T INTERVAL, ECG07: 356 MS
QRS DURATION, ECG06: 82 MS
QTC CALCULATION (BEZET), ECG08: 440 MS
VENTRICULAR RATE, ECG03: 92 BPM

## 2019-06-13 ENCOUNTER — OFFICE VISIT (OUTPATIENT)
Dept: FAMILY MEDICINE CLINIC | Age: 44
End: 2019-06-13

## 2019-06-13 VITALS
WEIGHT: 215 LBS | OXYGEN SATURATION: 98 % | HEIGHT: 65 IN | HEART RATE: 83 BPM | DIASTOLIC BLOOD PRESSURE: 58 MMHG | RESPIRATION RATE: 16 BRPM | TEMPERATURE: 97 F | BODY MASS INDEX: 35.82 KG/M2 | SYSTOLIC BLOOD PRESSURE: 98 MMHG

## 2019-06-13 DIAGNOSIS — Z76.89 ENCOUNTER TO ESTABLISH CARE: ICD-10-CM

## 2019-06-13 DIAGNOSIS — K29.00 OTHER ACUTE GASTRITIS WITHOUT HEMORRHAGE: Primary | ICD-10-CM

## 2019-06-13 DIAGNOSIS — E66.01 SEVERE OBESITY (HCC): ICD-10-CM

## 2019-06-13 RX ORDER — OMEPRAZOLE 20 MG/1
20 CAPSULE, DELAYED RELEASE ORAL DAILY
Qty: 30 CAP | Refills: 2 | Status: SHIPPED | OUTPATIENT
Start: 2019-06-13 | End: 2020-03-14

## 2019-06-13 NOTE — PATIENT INSTRUCTIONS
Medicaid health care     Gastritis: Instrucciones de cuidado - [ Gastritis: Care Instructions ]  Instrucciones de cuidado    La gastritis es dolor y malestar estomacal. Sucede cuando algo irrita el revestimiento del estómago. Hay muchas cosas que pueden causarla. Entre estas se incluyen nahed infección leon la gripe o algo que ha comido o bebido. Los medicamentos o nahed llaga en el recubrimiento del estómago ([place]) también pueden causarla. Puede tener abotagamiento y dolor abdominal. Podría eructar, vomitar y tener revoltura estomacal.  Usted debería poder aliviar el problema tomando medicamentos. Y karey vez sería útil cambiar la alimentación. Si la gastritis continúa, parker médico podría recetarle medicamentos. La atención de seguimiento es nahed parte clave de parker tratamiento y seguridad. Asegúrese de hacer y acudir a todas las citas, y llame a parker médico si está teniendo problemas. También es nahde buena idea saber los resultados de rahel exámenes y mantener nahed lista de los medicamentos que dilip. ¿Cómo puede cuidarse en el hogar? · Si parker médico le recetó antibióticos, tómelos según las indicaciones. No deje de tomarlos por el hecho de sentirse mejor. Debe jess todos los antibióticos hasta terminarlos. · Sea esha con los medicamentos. Si parker médico le recetó un medicamento para reducir el ácido estomacal, tómelo según las indicaciones. Llame a parker médico si michela estar teniendo problemas con parker medicamento. · No tome ningún otro medicamento, incluyendo analgésicos (medicamentos para el dolor) de venta mesfin, sin consultar con parker médico wilmer. · Si parker médico le recomienda medicamentos de venta mesfin para reducir el ácido estomacal, tales leon Pepcid AC, Prilosec, Tagamet HB o Zantac 75, siga las instrucciones de la etiqueta. · Laney abundantes líquidos (los suficientes leon para que parker orina sea de color amarillo joni o transparente leon el agua) para prevenir la deshidratación.  Elija jess agua y otros líquidos dolores sin cafeína. Si tiene [place] [employer] [facility] [employer], el corazón o el hígado y tiene que [employer] líquidos, hable con parekr médico antes de aumentar parker consumo. · Limite la cantidad de alcohol que nevaeh. · Evite el café, el té, las bebidas de cola, el chocolate y otros alimentos que contengan cafeína. Aumentan el ácido estomacal.  ¿Cuándo debe pedir ayuda? Llame al 911 en cualquier momento que considere que necesita atención de [place]. Por ejemplo, llame si:    · Vomita denys o algo parecido a granos de café molido.     · Cortney heces son de color rojizo o muy sanguinolentas (con denys).    Llame a parker médico ahora mismo o busque atención médica inmediata si:    · Empieza a respirar en forma acelerada y no ha producido orina en las últimas 8 horas.     · No puede retener líquidos en el estómago.    Preste especial atención a los cambios en parker robyn y asegúrese de comunicarse con parker médico si:    · No mejora leon se esperaba. ¿Dónde puede encontrar más información en inglés? [place] a http://zabrina-noble.info/. Alisia  [id number] en la búsqueda para aprender más acerca de \"Gastritis: Instrucciones de cuidado - [ Gastritis: Care Instructions ]. \"  Revisado: [address]ores 67, 2018  Versión del contenido: 11.9  © 1241-1519 Healthwise, Incorporated. Las instrucciones de cuidado fueron adaptadas bajo licencia por [employer] (which disclaims liability or warranty for this information). Si usted tiene [place] afección médica o sobre estas instrucciones, siempre pregunte a parker profesional de robyn. Healthwise, Incorporated niega toda garantía o responsabilidad por parker uso de esta información.

## 2019-06-13 NOTE — PROGRESS NOTES
[address] [place] [address]               [phone]      Marla Christopher is a 37 y.o. female and presents with Hospital Follow Up (seen at [place] [address] for Enteritis)       Assessment/Plan:    Diagnoses and all orders for this visit:    1. Other acute gastritis without hemorrhage  -     omeprazole (PRILOSEC) 20 mg capsule; Take 1 Cap by mouth daily. 2. Severe obesity ([address]    3. Encounter to establish care    continue with dicyclomine and omeprazole, instructed her to stay hydraged    Discussed the patient's BMI with him. The BMI follow up plan is as follows:     dietary management education, guidance, and counseling  encourage exercise  monitor weight  prescribed dietary intake  Discussed portion control  Eat fresh or frozen fruits and vegetables, stay away from canned  Limit eating out and fast food  Practice meal planning    Her primary language is Moz[place], no  was used at this visit, she is able to communicate in [address] effectively    Follow-up and Dispositions    · Return in about 3 months (around 9/13/2019) for chronic conditions, dr. Tg Wood only!, 30 minutes. Subjective:    Health insurance  She currently has no health insurance  Assisted her in finding the numbers to call so she can set up insurance  She states her son can help her with this    ED  For abd pain, nausea, vomiting and diarrhea  Dx with enterogastritis  CT scan negative   given diclyclomine and omeprazole     encorses still having epigastric discomfort, abd distention with eating and drinking intermittenly, still with diarrhea  opemprazole helps    No more nausea, vomiting,       ROS:     ROS    The problem list was updated as a part of today's visit. Patient Active Problem List   Diagnosis Code    Contusion T14. 8XXA    Foot sprain J11.222A    Severe obesity ([address] E66.01       The PSH, FH were reviewed.       SH:  Social History     Tobacco Use    Smoking status: Never Smoker  Smokeless tobacco: Never Used   Substance Use Topics    Alcohol use: No    Drug use: No       Medications/Allergies:  Current Outpatient Medications on File Prior to Visit   Medication Sig Dispense Refill    ondansetron hcl (ZOFRAN) 4 mg tablet Take 1 Tab by mouth every eight (8) hours as needed for Nausea. 12 Tab 0     No current facility-administered medications on file prior to visit. No Known Allergies    Objective:  Visit Vitals  BP 98/58   Pulse 83   Temp 97 °F (36.1 °C) (Oral)   Resp 16   Ht 5' 5\" (1.651 m)   Wt 215 lb (97.5 kg)   SpO2 98%   BMI 35.78 kg/m²    Body mass index is 35.78 kg/m². Physical assessment  Physical Exam   Constitutional: She is oriented to person, place, and time and well-developed, well-nourished, and in no distress. Vital signs are normal.   HENT:   Head: Normocephalic and atraumatic. Cardiovascular: Normal rate, regular rhythm and normal heart sounds. Pulmonary/Chest: Effort normal and breath sounds normal.   Abdominal: Soft. Normal appearance and bowel sounds are normal. There is no tenderness. Neurological: She is alert and oriented to person, place, and time. Gait normal.   Skin: Skin is warm, dry and intact. Nursing note and vitals reviewed.         Labwork and Ancillary Studies:    CBC w/Diff  Lab Results   Component Value Date/Time    WBC 12.7 06/01/2019 12:24 AM    HGB 13.3 06/01/2019 12:24 AM    PLATELET 588 05/20/7569 12:24 AM         Basic Metabolic Profile  Lab Results   Component Value Date/Time    Sodium 137 06/01/2019 12:24 AM    Potassium 3.8 06/01/2019 12:24 AM    Chloride 103 06/01/2019 12:24 AM    CO2 27 06/01/2019 12:24 AM    Anion gap 7 06/01/2019 12:24 AM    Glucose 128 (H) 06/01/2019 12:24 AM    BUN 14 06/01/2019 12:24 AM    Creatinine 0.82 06/01/2019 12:24 AM    BUN/Creatinine ratio 17 06/01/2019 12:24 AM    GFR est AA >60 06/01/2019 12:24 AM    GFR est non-AA >60 06/01/2019 12:24 AM    Calcium 8.4 (L) 06/01/2019 12:24 AM Cholesterol  Lab Results   Component Value Date/Time    Cholesterol, total 155 08/18/2016 02:46 PM    HDL Cholesterol 57 08/18/2016 02:46 PM    LDL, calculated 70.2 08/18/2016 02:46 PM    Triglyceride 139 08/18/2016 02:46 PM    CHOL/HDL Ratio 2.7 08/18/2016 02:46 PM       Health Maintenance:   Health Maintenance   Topic Date Due    DTaP/Tdap/Td series (1 - Tdap) 08/01/2019 (Originally 10/28/1994)    BREAST CANCER SCRN MAMMOGRAM  08/01/2019 (Originally 10/28/1991)    PAP AKA CERVICAL CYTOLOGY  08/01/2019 (Originally 10/28/1994)    Influenza Age 5 to Adult  08/01/2019    Pneumococcal 0-64 years  Aged Out       I have discussed the diagnosis with the patient and the intended plan as seen in the above orders. The patient has received an After-Visit Summary and questions were answered concerning future plans. An After Visit Summary was printed and given to the patient. All diagnosis have been discussed with the patient and all of the patient's questions have been answered. Follow-up and Dispositions    · Return in about 3 months (around 9/13/2019) for chronic conditions, dr. Sandhya Ignacio only!, 30 minutes. Whit Mckeon, [facility]  [address] [facility]   [address] [facility] [address]  [id number]

## 2019-06-13 NOTE — PROGRESS NOTES
1. Have you been to the ER, urgent care clinic since your last visit? Hospitalized since your last visit? Yes When: 6-6-2019 for Enteritis    2. Have you seen or consulted any other health care providers outside of the [address] [place] [address] since your last visit? Include any pap smears or colon screening.  No     Chief Complaint   Patient presents with   [facility] Follow Up     seen at [place] [address] for Enteritis

## 2020-03-14 ENCOUNTER — HOSPITAL ENCOUNTER (EMERGENCY)
Age: 45
Discharge: HOME | End: 2020-03-15
Attending: EMERGENCY MEDICINE | Admitting: EMERGENCY MEDICINE
Payer: SELF-PAY

## 2020-03-14 DIAGNOSIS — J02.9 SORE THROAT: ICD-10-CM

## 2020-03-14 DIAGNOSIS — R51.9 ACUTE NONINTRACTABLE HEADACHE, UNSPECIFIED HEADACHE TYPE: Primary | ICD-10-CM

## 2020-03-14 PROCEDURE — 87081 CULTURE SCREEN ONLY: CPT

## 2020-03-14 PROCEDURE — 74011250637 HC RX REV CODE- 250/637: Performed by: PHYSICIAN ASSISTANT

## 2020-03-14 PROCEDURE — 99283 EMERGENCY DEPT VISIT LOW MDM: CPT

## 2020-03-14 RX ORDER — IBUPROFEN 400 MG/1
800 TABLET ORAL
Status: COMPLETED | OUTPATIENT
Start: 2020-03-14 | End: 2020-03-14

## 2020-03-14 RX ORDER — ACETAMINOPHEN 500 MG
500 TABLET ORAL
Status: COMPLETED | OUTPATIENT
Start: 2020-03-14 | End: 2020-03-14

## 2020-03-14 RX ORDER — BENZOCAINE AND MENTHOL 15; 2.6 MG/1; MG/1
1 LOZENGE ORAL
Qty: 40 LOZENGE | Refills: 0 | Status: SHIPPED | OUTPATIENT
Start: 2020-03-14

## 2020-03-14 RX ORDER — PROCHLORPERAZINE MALEATE 5 MG
10 TABLET ORAL
Status: DISCONTINUED | OUTPATIENT
Start: 2020-03-14 | End: 2020-03-15 | Stop reason: HOSPADM

## 2020-03-14 RX ADMIN — ACETAMINOPHEN 500 MG: 500 TABLET, FILM COATED ORAL at 22:54

## 2020-03-14 RX ADMIN — IBUPROFEN 800 MG: 400 TABLET ORAL at 22:55

## 2020-03-14 NOTE — LETTER
[facility] EMERGENCY DEPT 
[address] 
[address] [facility] [id number] [phone] Work/School Note Date: 3/14/2020 To Whom It May concern: 
 
Marla Christopher was seen and treated today in the emergency room by the following provider(s): 
Attending Provider: Marin Mccurdy MD 
Physician Assistant: Dalton Gomez PA-C. Marla Christopher may return to work on 3/16/2020. Sincerely, Bernadine Payton PA-C

## 2020-03-15 VITALS
TEMPERATURE: 98 F | HEART RATE: 76 BPM | RESPIRATION RATE: 16 BRPM | DIASTOLIC BLOOD PRESSURE: 74 MMHG | OXYGEN SATURATION: 100 % | WEIGHT: 220 LBS | SYSTOLIC BLOOD PRESSURE: 116 MMHG | BODY MASS INDEX: 36.61 KG/M2

## 2020-03-15 NOTE — ED TRIAGE NOTES
Pt to triage c/o sore throat and HA x 1 day, no N/V, no blurred vision, has not taken anything for pain because 'its too much'

## 2020-03-15 NOTE — DISCHARGE INSTRUCTIONS
Patient Education        Dolor de garganta: Instrucciones de cuidado  Sore Throat: Care Instructions  Instrucciones de cuidado    Nahed infección por un virus o nahed bacteria causa la mayoría de los chava de garganta. El humo del cigarrillo, el aire seco, el aire contaminado, las [place] y gritar también pueden causar dolor de garganta. El dolor de garganta puede ser intenso y [place]. Por jerilyn, la mayoría de los chava de garganta desaparecen por sí mismos. Si tiene [employer], el médico podría recetarle antibióticos. La atención de seguimiento es nahed parte clave de parker tratamiento y seguridad. Asegúrese de hacer y acudir a todas las citas, y llame a parker médico si está teniendo problemas. También es nahed buena idea saber los resultados de rahel exámenes y mantener nahed lista de los medicamentos que dilip. ¿Cómo puede cuidarse en el hogar? · Si parker médico le recetó antibióticos, tómelos según las indicaciones. No deje de tomarlos por el hecho de sentirse mejor. Debe jess todos los antibióticos hasta terminarlos. · Jamil gárgaras de agua salada tibia nahed vez cada hora para ayudar a reducir la hinchazón y aliviar la molestia. Mezcle 1 cucharadita de sal en 1 taza de agua tibia. · [place] un analgésico (medicamento para el dolor) de venta mesfin, leon acetaminofén (Tylenol), ibuprofeno (Advil, Motrin) o naproxeno (Aleve). Melissa y siga todas las instrucciones de la [place]. · Tenga cuidado cuando tome medicamentos de venta mesfin para el resfriado o la gripe y Tylenol al [employer]. Muchos de estos medicamentos contienen acetaminofén, o sea, Tylenol. Melissa las etiquetas para asegurarse de que no está tomando nahed dosis mayor que la recomendada. El exceso de acetaminofén (Tylenol) puede ser dañino. · [place] abundantes líquidos. Los líquidos podrían ayudar a aliviar la irritación de la garganta. Beber líquidos calientes, leon té o sopa, podría ayudarle a reducir el dolor de garganta.   · Chupe pastillas para la garganta de venta mesfin para aliviar el dolor. Los caramelos duros o los caramelos regulares para la tos también podrían ayudar. Nunca se los dé a un gene pequeño porque corre el riesgo de atragantarse. · No fume ni permita que otros fumen cerca de usted. Si necesita ayuda para dejar de fumar, hable con parker médico [employer] y medicamentos para dejar de fumar. Estos pueden aumentar rahel probabilidades de dejar el hábito para siempre. · Use un humidificador o vaporizador para añadir humedad en parker dormitorio. Siga las instrucciones para limpiar el aparato. ¿Cuándo debe pedir ayuda? Llame a parker médico ahora mismo o busque atención médica inmediata si:    · Tiene dificultades para tragar o estas empeoran.     · El dolor de garganta empeora mucho en un lado.    Preste especial atención a los cambios en parker robyn y asegúrese de comunicarse con parker médico si no mejora leon se esperaba. ¿Dónde puede encontrar más información en inglés? Vaya a http://zabrina-noble.info/  Glenn Wilcox J050 en la búsqueda para aprender más acerca de \"Dolor de garganta: Instrucciones de cuidado. \"  Revisado: 28 julio, 2019Versión del contenido: 12.4  © 4901-8792 Healthwise, Incorporated. Las instrucciones de cuidado fueron adaptadas bajo licencia por Good Help [employer]ctions (which disclaims liability or warranty for this information). Si usted tiene [place] afección médica o sobre estas instrucciones, siempre pregunte a parker profesional de robyn. Healthwise, Incorporated niega toda garantía o responsabilidad por parker uso de esta información.

## 2020-03-15 NOTE — ED PROVIDER NOTES
EMERGENCY DEPARTMENT HISTORY AND PHYSICAL EXAM    Date: 3/14/2020  Patient Name: Huan Dang    History of Presenting Illness     Chief Complaint   Patient presents with    Headache    Sore Throat         History Provided By: Patient    Chief Complaint: headache  Duration: 1 Days  Timing:  Acute  Location: head, throat  Quality: Burning  Severity: Moderate  Modifying Factors: none  Associated Symptoms: sore throat      HPI: Marla Christopher is a 53 y.o. female with a PMH of No significant past medical history who presents to the ER complaining of nontraumatic headache and sore throat. Patient states her symptoms began earlier today. She has not taken any medicine for her symptoms. She denied any recent sick contacts. She has not had any recent travel. Patient states her pain is been too much and she has been unable to take medication because of this. Denied any associated fevers, chills, neck stiffness, photophobia, nausea or vomiting, dizziness and has no other symptoms or complaints. PCP: None        Past History     Past Medical History:  History reviewed. No pertinent past medical history. Past Surgical History:  Past Surgical History:   Procedure Laterality Date    HX TUBAL LIGATION         Family History:  History reviewed. No pertinent family history. Social History:  Social History     Tobacco Use    Smoking status: Never Smoker    Smokeless tobacco: Never Used   Substance Use Topics    Alcohol use: No    Drug use: No       Allergies:  No Known Allergies      Review of Systems   Review of Systems   Constitutional: Negative for chills, fatigue and fever. HENT: Positive for sore throat. Eyes: Negative. Negative for photophobia. Respiratory: Negative for cough and shortness of breath. Cardiovascular: Negative for chest pain and palpitations. Gastrointestinal: Negative for abdominal pain, nausea and vomiting. Genitourinary: Negative for dysuria.    Musculoskeletal: Negative. Negative for neck stiffness. Skin: Negative. Neurological: Positive for headaches. Negative for dizziness, weakness and light-headedness. Psychiatric/Behavioral: Negative. All other systems reviewed and are negative. Physical Exam     Vitals:    03/14/20 2229 03/14/20 2303   BP: 113/79    Pulse: 82    Resp: 18    Temp: 98.4 °F (36.9 °C)    SpO2: 97% 99%   Weight: 99.8 kg (220 lb)      Physical Exam  Vitals signs and nursing note reviewed. Constitutional:       General: She is not in acute distress. Appearance: She is well-developed. She is not ill-appearing. HENT:      Head: Normocephalic and atraumatic. Right Ear: Tympanic membrane, ear canal and external ear normal.      Left Ear: Tympanic membrane, ear canal and external ear normal.      Nose: Nose normal.      Mouth/Throat:      Lips: Pink. Mouth: Mucous membranes are moist.      Pharynx: Oropharynx is clear. Uvula midline. Posterior oropharyngeal erythema present. No pharyngeal swelling or uvula swelling. Tonsils: No tonsillar exudate or tonsillar abscesses. 2+ on the right. 2+ on the left. Comments: Tolerating oral secretions  Eyes:      General: No scleral icterus. Conjunctiva/sclera: Conjunctivae normal.      Pupils: Pupils are equal, round, and reactive to light. Neck:      Musculoskeletal: Normal range of motion and neck supple. Vascular: No JVD. Trachea: No tracheal deviation. Cardiovascular:      Rate and Rhythm: Normal rate and regular rhythm. Heart sounds: Normal heart sounds. No murmur. Pulmonary:      Effort: Pulmonary effort is normal. No respiratory distress. Breath sounds: Normal breath sounds. No stridor. No wheezing, rhonchi or rales. Abdominal:      Palpations: Abdomen is soft. Tenderness: There is no abdominal tenderness. Musculoskeletal: Normal range of motion. Lymphadenopathy:      Cervical: No cervical adenopathy.    Skin:     General: Skin is warm and dry. Capillary Refill: Capillary refill takes less than 2 seconds. Neurological:      Mental Status: She is alert and oriented to person, place, and time. GCS: GCS eye subscore is 4. GCS verbal subscore is 5. GCS motor subscore is 6. Gait: Gait normal.           Diagnostic Study Results     Labs -     Recent Results (from the past 12 hour(s))   STREP THROAT SCREEN    Collection Time: 03/14/20 10:57 PM   Result Value Ref Range    Special Requests: NO SPECIAL REQUESTS      Strep Screen NEGATIVE       Culture result: PENDING        Radiologic Studies -   No orders to display     CT Results  (Last 48 hours)    None        CXR Results  (Last 48 hours)    None            Medical Decision Making   I am the first provider for this patient. I reviewed the vital signs, available nursing notes, past medical history, past surgical history, family history and social history. Vital Signs-Reviewed the patient's vital signs. Records Reviewed: Nursing Notes and Old Medical Records     847 PM  75-year-old female presents to the ER complaint of acute onset of headache and sore throat. Symptoms began earlier today. Patient states the pain is been too much and she has not taken any medicine for symptoms. No recent sick contacts or travel. No associated fevers or neck stiffness. Well-appearing on exam with no signs of sepsis. We will plan on medication for headache relief and strep throat screening. Kavon Martínez PA-C     11:51 PM  Strep throat screen negative. Patient reports significant improvement in her headache after medications given previously. No clinical indication for further imaging or testing at this time. Patient stable for discharge and primary care follow-up. All questions answered and patient in agreement with plan of care. Will plan for discharge. Kavon Martínez PA-C    Disposition:  discharged    DISCHARGE NOTE:       Care plan outlined and precautions discussed.   Patient has no new complaints, changes, or physical findings. Results of labs were reviewed with the patient. All medications were reviewed with the patient; will d/c home with cepacol. All of pt's questions and concerns were addressed. Patient was instructed and agrees to follow up with pcp, as well as to return to the ED upon further deterioration. Patient is ready to go home. Follow-up Information     Follow up With Specialties Details Why [facility] EMERGENCY DEPT Emergency Medicine  If symptoms worsen [address] [place] [address]    [place]  Schedule an appointment as soon as possible for a visit in 2 days primary care follow up [place]  [id number] [phone]          Current Discharge Medication List      START taking these medications    Details   benzocaine-menthoL (Cepacol Sore Throat, sky-men,) 15-2.6 mg lozg lozenge Take 1 Lozenge by mouth every two (2) hours as needed for Sore throat. Qty: 40 Lozenge, Refills: 0             Provider Notes (Medical Decision Making):     Procedures:  Procedures        Diagnosis     Clinical Impression:   1. Acute nonintractable headache, unspecified headache type    2.  Sore throat

## 2020-03-17 LAB
B-HEM STREP THROAT QL CULT: NEGATIVE
BACTERIA SPEC CULT: NORMAL
SERVICE CMNT-IMP: NORMAL

## 2021-01-21 ENCOUNTER — OFFICE VISIT (OUTPATIENT)
Dept: FAMILY MEDICINE CLINIC | Age: 46
End: 2021-01-21

## 2021-01-21 VITALS
OXYGEN SATURATION: 100 % | BODY MASS INDEX: 37.82 KG/M2 | RESPIRATION RATE: 18 BRPM | HEART RATE: 80 BPM | WEIGHT: 227 LBS | TEMPERATURE: 97.8 F | DIASTOLIC BLOOD PRESSURE: 80 MMHG | HEIGHT: 65 IN | SYSTOLIC BLOOD PRESSURE: 115 MMHG

## 2021-01-21 DIAGNOSIS — N95.0 POST-MENOPAUSE BLEEDING: ICD-10-CM

## 2021-01-21 DIAGNOSIS — N95.0 POST-MENOPAUSE BLEEDING: Primary | ICD-10-CM

## 2021-01-21 PROCEDURE — 99203 OFFICE O/P NEW LOW 30 MIN: CPT | Performed by: STUDENT IN AN ORGANIZED HEALTH CARE EDUCATION/TRAINING PROGRAM

## 2021-01-21 NOTE — PROGRESS NOTES
History of Present Illness  Marla Christopher is a 50 y.o. female who presents today for management of    Chief Complaint   Patient presents with    Establish Care    Abdominal Pain    Irregular Menses     pt states she dd not have a period for 2 years, came on Jan 12 with clots    Fatigue       Patient is here to establish care. Previous PCP: none    Patient states that she is concerned because 2 years ago she stopped having her menses. Then on January 12, 2021 she began having heavy bleeding from vagina, saw clots, and noted that the blood had foul odor to it. States that her periods stayed until January 20. She states that associated symptoms to this was back pain and pelvic pain. She is concerned because her sister also had a similar situation where she had to had 3 fibroids removed from her. Patient states that she is not sexually active at this time. And states also that she has a lot of itching while she was bleeding. Past Medical History  No past medical history on file. Surgical History  Past Surgical History:   Procedure Laterality Date    HX TUBAL LIGATION          Current Medications  Current Outpatient Medications   Medication Sig    benzocaine-menthoL (Cepacol Sore Throat, sky-men,) 15-2.6 mg lozg lozenge Take 1 Lozenge by mouth every two (2) hours as needed for Sore throat. No current facility-administered medications for this visit. Allergies/Drug Reactions  No Known Allergies     Family History  No family history on file.      Social History  Social History     Tobacco Use    Smoking status: Never Smoker    Smokeless tobacco: Never Used   Substance Use Topics    Alcohol use: No    Drug use: No        Health Maintenance   Topic Date Due    COVID-19 Vaccine (1 of 2) 10/28/1989    DTaP/Tdap/Td series (1 - Tdap) 10/28/1994    PAP AKA CERVICAL CYTOLOGY  10/28/1994    Flu Vaccine (1) 09/01/2020    Lipid Screen  08/18/2021    Pneumococcal 0-64 years  Aged [place] There is no immunization history on file for this patient. Review of Systems  Review of Systems   Constitutional: Negative for chills, fever and malaise/fatigue. HENT: Negative for congestion, ear discharge and ear pain. Eyes: Negative for blurred vision, pain and discharge. Respiratory: Negative for cough and shortness of breath. Cardiovascular: Negative for chest pain and palpitations. Gastrointestinal: Negative for abdominal pain, nausea and vomiting. Genitourinary: Negative for dysuria, frequency and urgency. Postmenopausal bleeding   Skin: Negative for itching and rash. Neurological: Negative for dizziness, seizures, loss of consciousness and headaches. Psychiatric/Behavioral: Negative for substance abuse. Physical Exam  Vital signs:   Vitals:    01/21/21 1431   BP: 115/80   Pulse: 80   Resp: 18   Temp: 97.8 °F (36.6 °C)   TempSrc: Temporal   SpO2: 100%   Weight: 227 lb (103 kg)   Height: 5' 5\" (1.651 m)     Physical Exam  Constitutional:       Appearance: Normal appearance. Eyes:      General: No scleral icterus. Right eye: No discharge. Left eye: No discharge. Neck:      Musculoskeletal: Normal range of motion and neck supple. Pulmonary:      Effort: Pulmonary effort is normal. No respiratory distress. Neurological:      Mental Status: She is alert and oriented to person, place, and time. Cranial Nerves: No cranial nerve deficit. Psychiatric:         Mood and Affect: Mood normal.         Behavior: Behavior normal.         Thought Content: Thought content normal.         Judgment: Judgment normal.       For patient to have Pap smear exam done today, however she declined and stated that she will have it done at a later date. Laboratory/Tests:      Assessment/Plan:    1. Post-menopause bleeding  From clinical picture it seems that patient may have fibroids. Likely the bleeding that she encounter was a fibroid atrophy.  Will get US done, and will f/u w/ pt to have pap smear.    - US PELV NON OBS; Future       Lab review: no lab studies available for review at time of visit      I have discussed the diagnosis with the patient and the intended plan as seen in the above orders. Questions were answered concerning future plans. I have discussed medication side effects and warnings with the patient as well. I have reviewed the plan of care with the patient, accepted their input and they are in agreement with the treatment goals. Follow-up and Dispositions    · Return in about 1 week (around 1/28/2021), or pap smear.          Marcelo Abreu MD  January 21, 2021

## 2021-01-21 NOTE — PATIENT INSTRUCTIONS
Kb Ibuprofen para el dolor Fibromas uterinos: Instrucciones de cuidado Uterine Fibroids: Care Instructions Instrucciones de cuidado Los fibromas uterinos son crecimientos en el [place]. Los fibromas no son cáncer. Los médicos no conocen la causa de los fibromas. Son muy comunes en las mujeres padmini la edad reproductiva. Los fibromas pueden aparecer dentro del útero, en la pared muscular del mismo o cerca de parker pared exterior. En algunas mujeres, los fibromas causan cólicos (retortijones) dolorosos y menstruaciones con sangrado intenso. En estos casos, kb medicamentos antiinflamatorios, pastillas anticonceptivas o usar un dispositivo intrauterino (DIU), a menudo ayuda a reducir los síntomas. Algunas veces, se necesita cirugía para tratar los fibromas. Sin embargo, si se está acercando a la menopausia, quizá desee esperar para naveen si los síntomas mejoran. La mayoría de los fibromas se encogen y desaparecen después de la menopausia, cuando los períodos menstruales se detienen por completo. La atención de seguimiento es nahed parte clave de parker tratamiento y seguridad. Asegúrese de hacer y acudir a todas las citas, y llame a parker médico si está teniendo problemas. También es nahed buena idea saber los resultados de rahel exámenes y mantener nahed lista de los medicamentos que dilip. Cómo puede cuidarse en el hogar? · Si parker médico le recetó un medicamento, tómelo exactamente leon le fue recetado. Llame a parker médico si michela estar teniendo problemas con parker medicamento. · [place] antiinflamatorios para el dolor. Estos incluyen ibuprofeno (Advil, Motrin) y naproxeno (Aleve). Melissa y siga todas las instrucciones de la [place]. · Utilice calor, por ejemplo, de nahed bolsa de [place] o nahed almohadilla térmica ajustada a temperatura baja, o un baño tibio para relajar los músculos tensos y [employer]. Póngase un paño jon entre la almohadilla térmica y la piel. Nunca se duerma mientras [place] nahed almohadilla térmica. · Acuéstese y póngase nahed almohada debajo de las rodillas. O acuéstese de lado y [place]. Estas posiciones pueden ayudar a aliviar el dolor o la presión en el abdomen. · Lleve la cuenta de la cantidad de toallas sanitarias o tampones que [place] cada día. · Jamil por lo menos 30 minutos de ejercicio la mayoría de los días de la [place]. Caminar es nahed buena opción. Es posible que también quiera hacer otras actividades, leon correr, nadar, [employer], o jugar al tenis u otros deportes de equipo. · Si sangra por más tiempo que de costumbre o tiene sangrado abundante, tome un multivitamínico con chio a diario. Cuándo debes pedir ayuda? Llama a tu médico ahora mismo o busca atención médica inmediata si: 
  · Tienes sangrado vaginal intenso. [place] significa que empapas las toallas sanitarias o los tampones que sueles usar cada hora, padmini 2 o más horas. Presta especial atención a los cambios en tu robyn y asegúrate de comunicarte con tu médico si: 
  · Tienes más sangrado vaginal, o el sangrado es más irregular. Dónde puede encontrar más información en inglés? Mary Spivey a http://www.gray.com/ Escribari B121 en la búsqueda para aprender más acerca de \"Fibromas uterinos: Instrucciones de cuidado. \" Revisado: 8 noviembre, 2019               Versión del contenido: 12.6 © 5016-3559 [employer], Incorporated. Las instrucciones de cuidado fueron adaptadas bajo licencia por [facility] (which disclaims liability or warranty for this information). Si usted tiene [place] afección médica o sobre estas instrucciones, siempre pregunte a parker profesional de robyn. [facility]orporated niega toda garantía o responsabilidad por parker uso de esta información.

## 2021-01-21 NOTE — PROGRESS NOTES
Marla Abis presents today for   Chief Complaint   Patient presents with   [facility]    Abdominal Pain    Irregular Menses     pt states she dd not have a period for 2 years, came on Jan 12 with clots    Fatigue       Is someone accompanying this pt? no    Is the patient using any DME equipment during OV? no    Depression Screening:  3 most recent PHQ Screens 1/21/2021   Little interest or pleasure in doing things Not at all   Feeling down, depressed, irritable, or hopeless Not at all   Total Score PHQ 2 0       Learning Assessment:  Learning Assessment 8/18/2016   PRIMARY LEARNER Patient   HIGHEST LEVEL OF EDUCATION - PRIMARY LEARNER  GRADUATED HIGH SCHOOL OR GED   BARRIERS PRIMARY LEARNER Richard Cortésnarinder [address] CAREGIVER Yes   CO-LEARNER NAME [facility] HIGHEST LEVEL OF EDUCATION 4 YEARS  [place]    NEED No   LEARNER PREFERENCE PRIMARY DEMONSTRATION     LISTENING     READING   LEARNER PREFERENCE CO-LEARNER DEMONSTRATION   LEARNING SPECIAL TOPICS no   ANSWERED BY patient   RELATIONSHIP SELF       Abuse Screening:  Abuse Screening Questionnaire 8/18/2016   Do you ever feel afraid of your partner? N   Are you in a relationship with someone who physically or mentally threatens you? N   Is it safe for you to go home? Y       Fall Risk  No flowsheet data found. Health Maintenance reviewed and discussed and ordered per Provider. Health Maintenance Due   Topic Date Due    COVID-19 Vaccine (1 of 2) 10/28/1989    DTaP/Tdap/Td series (1 - Tdap) 10/28/1994    PAP AKA CERVICAL CYTOLOGY  10/28/1994    Flu Vaccine (1) 09/01/2020   . Coordination of Care:  1. Have you been to the ER, urgent care clinic since your last visit? Hospitalized since your last visit? no    2.  Have you seen or consulted any other health care providers outside of the [address] [place] [address] since your last visit? Include any pap smears or colon screening.  no      Last  Checked no  Last UDS Checked no  Last Pain contract signed: no

## 2021-02-04 ENCOUNTER — HOSPITAL ENCOUNTER (OUTPATIENT)
Dept: LAB | Age: 46
Discharge: HOME | End: 2021-02-04

## 2021-02-04 ENCOUNTER — OFFICE VISIT (OUTPATIENT)
Dept: FAMILY MEDICINE CLINIC | Age: 46
End: 2021-02-04

## 2021-02-04 VITALS
DIASTOLIC BLOOD PRESSURE: 54 MMHG | OXYGEN SATURATION: 99 % | RESPIRATION RATE: 18 BRPM | HEART RATE: 65 BPM | WEIGHT: 227 LBS | HEIGHT: 65 IN | BODY MASS INDEX: 37.82 KG/M2 | SYSTOLIC BLOOD PRESSURE: 91 MMHG | TEMPERATURE: 97.4 F

## 2021-02-04 DIAGNOSIS — M54.50 CHRONIC LOW BACK PAIN WITHOUT SCIATICA, UNSPECIFIED BACK PAIN LATERALITY: ICD-10-CM

## 2021-02-04 DIAGNOSIS — E66.9 OBESITY (BMI 35.0-39.9 WITHOUT COMORBIDITY): ICD-10-CM

## 2021-02-04 DIAGNOSIS — G89.29 CHRONIC LOW BACK PAIN WITHOUT SCIATICA, UNSPECIFIED BACK PAIN LATERALITY: ICD-10-CM

## 2021-02-04 DIAGNOSIS — E66.9 OBESITY (BMI 35.0-39.9 WITHOUT COMORBIDITY): Primary | ICD-10-CM

## 2021-02-04 LAB
ALBUMIN SERPL-MCNC: 3.8 G/DL (ref 3.4–5)
ALBUMIN/GLOB SERPL: 1.1 {RATIO} (ref 0.8–1.7)
ALP SERPL-CCNC: 126 U/L (ref 45–117)
ALT SERPL-CCNC: 27 U/L (ref 13–56)
ANION GAP SERPL CALC-SCNC: 5 MMOL/L (ref 3–18)
AST SERPL-CCNC: 14 U/L (ref 10–38)
BASOPHILS # BLD: 0 K/UL (ref 0–0.1)
BASOPHILS NFR BLD: 0 % (ref 0–2)
BILIRUB SERPL-MCNC: 0.4 MG/DL (ref 0.2–1)
BUN SERPL-MCNC: 15 MG/DL (ref 7–18)
BUN/CREAT SERPL: 19 (ref 12–20)
CALCIUM SERPL-MCNC: 8.5 MG/DL (ref 8.5–10.1)
CHLORIDE SERPL-SCNC: 108 MMOL/L (ref 100–111)
CHOLEST SERPL-MCNC: 179 MG/DL
CO2 SERPL-SCNC: 29 MMOL/L (ref 21–32)
CREAT SERPL-MCNC: 0.77 MG/DL (ref 0.6–1.3)
DIFFERENTIAL METHOD BLD: NORMAL
EOSINOPHIL # BLD: 0.2 K/UL (ref 0–0.4)
EOSINOPHIL NFR BLD: 3 % (ref 0–5)
ERYTHROCYTE [DISTWIDTH] IN BLOOD BY AUTOMATED COUNT: 13.2 % (ref 11.6–14.5)
EST. AVERAGE GLUCOSE BLD GHB EST-MCNC: 126 MG/DL
GLOBULIN SER CALC-MCNC: 3.6 G/DL (ref 2–4)
GLUCOSE SERPL-MCNC: 106 MG/DL (ref 74–99)
HBA1C MFR BLD: 6 % (ref 4.2–5.6)
HCT VFR BLD AUTO: 40.4 % (ref 35–45)
HDLC SERPL-MCNC: 45 MG/DL (ref 40–60)
HDLC SERPL: 4 {RATIO} (ref 0–5)
HGB BLD-MCNC: 13.1 G/DL (ref 12–16)
LDLC SERPL CALC-MCNC: 108.4 MG/DL (ref 0–100)
LIPID PROFILE,FLP: ABNORMAL
LYMPHOCYTES # BLD: 2.8 K/UL (ref 0.9–3.6)
LYMPHOCYTES NFR BLD: 31 % (ref 21–52)
MCH RBC QN AUTO: 26.7 PG (ref 24–34)
MCHC RBC AUTO-ENTMCNC: 32.4 G/DL (ref 31–37)
MCV RBC AUTO: 82.4 FL (ref 74–97)
MONOCYTES # BLD: 0.6 K/UL (ref 0.05–1.2)
MONOCYTES NFR BLD: 7 % (ref 3–10)
NEUTS SEG # BLD: 5.3 K/UL (ref 1.8–8)
NEUTS SEG NFR BLD: 59 % (ref 40–73)
PLATELET # BLD AUTO: 270 K/UL (ref 135–420)
PMV BLD AUTO: 11.4 FL (ref 9.2–11.8)
POTASSIUM SERPL-SCNC: 4.5 MMOL/L (ref 3.5–5.5)
PROT SERPL-MCNC: 7.4 G/DL (ref 6.4–8.2)
RBC # BLD AUTO: 4.9 M/UL (ref 4.2–5.3)
SODIUM SERPL-SCNC: 142 MMOL/L (ref 136–145)
TRIGL SERPL-MCNC: 128 MG/DL (ref ?–150)
VLDLC SERPL CALC-MCNC: 25.6 MG/DL
WBC # BLD AUTO: 9 K/UL (ref 4.6–13.2)

## 2021-02-04 PROCEDURE — 99213 OFFICE O/P EST LOW 20 MIN: CPT | Performed by: STUDENT IN AN ORGANIZED HEALTH CARE EDUCATION/TRAINING PROGRAM

## 2021-02-04 PROCEDURE — 83036 HEMOGLOBIN GLYCOSYLATED A1C: CPT

## 2021-02-04 PROCEDURE — 80061 LIPID PANEL: CPT

## 2021-02-04 PROCEDURE — 80053 COMPREHEN METABOLIC PANEL: CPT

## 2021-02-04 PROCEDURE — 85025 COMPLETE CBC W/AUTO DIFF WBC: CPT

## 2021-02-04 PROCEDURE — 36415 COLL VENOUS BLD VENIPUNCTURE: CPT

## 2021-02-04 NOTE — PROGRESS NOTES
Marla Christopher is a 50 y.o.  female and presents with    Chief Complaint   Patient presents with    Neck Swelling     per patient it's inflamed            Subjective:  Patient was supposed to be here for having a Pap smear. However patient states today that 1 year ago she had a Pap smear at [facility], and at that time he was told that it was normal and she did not need a Pap smear for the next 5 years. Visit she is having some back pain. Describes this at the lower end of the back. Upon this asking her if if it is the neck patient states that no it is the back that is paining her. States that this is causing her issues when he comes to  things from the floor, or when he comes to playing with her grandkids. States that she has taken some natural remedies for it with some mild relief. Patient Active Problem List   Diagnosis Code    Contusion T14. 8XXA    Foot sprain S93.609A    Severe obesity ([facility] [address] E66.01      History reviewed. No pertinent past medical history. Past Surgical History:   Procedure Laterality Date    HX TUBAL LIGATION        History reviewed. No pertinent family history.   Social History     Socioeconomic History    Marital status:      Spouse name: Not on file    Number of children: Not on file    Years of education: Not on file    Highest education level: Not on file   Occupational History    Not on file   Social Needs    Financial resource strain: Not on file    Food insecurity     Worry: Not on file     Inability: Not on file    Transportation needs     Medical: Not on file     Non-medical: Not on file   Tobacco Use    Smoking status: Never Smoker    Smokeless tobacco: Never Used   Substance and Sexual Activity    Alcohol use: No    Drug use: No    Sexual activity: Not on file   Lifestyle    Physical activity     Days per week: Not on file     Minutes per session: Not on file    Stress: Not on file   Relationships    Social connections Talks on phone: Not on file     Gets together: Not on file     Attends Episcopalian service: Not on file     Active member of club or organization: Not on file     Attends meetings of clubs or organizations: Not on file     Relationship status: Not on file    Intimate partner violence     Fear of current or ex partner: Not on file     Emotionally abused: Not on file     Physically abused: Not on file     Forced sexual activity: Not on file   Other Topics Concern    Not on file   Social History Narrative    Not on file        Current Outpatient Medications   Medication Sig Dispense Refill    benzocaine-menthoL (Cepacol Sore Throat, sky-men,) 15-2.6 mg lozg lozenge Take 1 Lozenge by mouth every two (2) hours as needed for Sore throat. 40 Lozenge 0        ROS   Review of Systems   Constitutional: Negative for chills, fever and malaise/fatigue. HENT: Negative for congestion, ear discharge and ear pain. Eyes: Negative for blurred vision, pain and discharge. Respiratory: Negative for cough and shortness of breath. Cardiovascular: Negative for chest pain and palpitations. Gastrointestinal: Negative for abdominal pain, nausea and vomiting. Genitourinary: Negative for dysuria, frequency and urgency. Musculoskeletal: Positive for back pain. Skin: Negative for itching and rash. Neurological: Negative for dizziness, seizures, loss of consciousness and headaches. Psychiatric/Behavioral: Negative for substance abuse. Objective:  Vitals:    02/04/21 0935   BP: (!) 91/54   Pulse: 65   Resp: 18   Temp: 97.4 °F (36.3 °C)   TempSrc: Temporal   SpO2: 99%   Weight: 227 lb (103 kg)   Height: 5' 5\" (1.651 m)   PainSc:   0 - No pain       Physical Exam  Constitutional:       Appearance: Normal appearance. Eyes:      General: No scleral icterus. Right eye: No discharge. Left eye: No discharge. Neck:      Musculoskeletal: Normal range of motion and neck supple.    Pulmonary: Effort: Pulmonary effort is normal. No respiratory distress. Musculoskeletal:      Thoracic back: Normal.      Lumbar back: Normal.   Neurological:      Mental Status: She is alert and oriented to person, place, and time. Cranial Nerves: No cranial nerve deficit. Psychiatric:         Mood and Affect: Mood normal.         Behavior: Behavior normal.         Thought Content: Thought content normal.         Judgment: Judgment normal.           LABS     TESTS      Assessment/Plan:    1. Obesity (BMI 35.0-39.9 without comorbidity)  - CBC WITH AUTOMATED DIFF; Future  - METABOLIC PANEL, COMPREHENSIVE; Future  - LIPID PANEL; Future  - HEMOGLOBIN A1C WITH EAG; Future    2. Chronic low back pain without sciatica, unspecified back pain laterality  Likely muscular pain. Discussed with patient that because she has not let the back rest it is unable to heal properly. Educated patient on different types of exercise that she can do in order to improve her back strength. Discussed with patient also that weight may be related to her back pain. Lab review: orders written for new lab studies as appropriate; see orders    On this date 02/05/21 I have spent 25 minutes reviewing previous notes, test results and face to face (virtual) with the patient discussing the diagnosis and importance of compliance with the treatment plan as well as documenting on the day of the visit. I have discussed the diagnosis with the patient and the intended plan as seen in the above orders. The patient has received an after-visit summary and questions were answered concerning future plans. I have discussed medication side effects and warnings with the patient as well. I have reviewed the plan of care with the patient, accepted their input and they are in agreement with the treatment goals. Follow-up and Dispositions    · Return in about 6 months (around 8/4/2021), or if symptoms worsen or fail to improve.          Aman Mitchell Jackie Arriola MD

## 2021-02-04 NOTE — PATIENT INSTRUCTIONS
Dolor en la parte baja de la espalda (lumbalgia): Ejercicios Low Back Pain: Exercises Instrucciones de cuidado Aquí se presentan algunos ejemplos de ejercicios típicos de rehabilitación para tratar parker afección. Empiece cada ejercicio lentamente. Reduzca la intensidad del ejercicio si [place] a tener dolor. Parker médico o fisioterapeuta le dirán cuándo puede comenzar con estos ejercicios y cuáles funcionarán mejor para usted. Cómo hacer los ejercicios Eloisa Colace 1. Acuéstese boca abajo, apoyando parker cuerpo con los antebrazos. 2. Jamil presión con los codos en el piso para elevar la espalda. Al hacer esto, relaje los músculos del estómago y permita que parker espalda se arquee sin usar los músculos de la espalda. Al hacer presión, evite que las caderas o la pelvis se separen del piso. 3. Mantenga la posición de 15 a 30 segundos y luego relájese. 4. Repita de 2 a 4 veces. Ejercicio de alternar [place] y pierna (komal de caza) 1. Comience con las dusty y las rodillas en el piso. 2. Contraiga los músculos del abdomen. 3. Eleve nahed pierna del suelo y manténgala recta detrás de usted. Tenga cuidado de no dejar caer la cadera hacia abajo, porque eso hará que se le tuerza el tronco. 
4. Mantenga la posición padmini unos 6 segundos y luego baje la pierna y Kathy a la otra pierna. 5. Repita de 8 a 12 veces con cada pierna. 6. Con el tiempo, vaya aumentando [employer] de 10 a 30 segundos cada vez. 
7. Si se siente estable y seguro con la pierna elevada, intente levantar el brazo opuesto directamente enfrente de usted al [employer]. Ejercicio de rodillas al [employer] 1. Acuéstese boca arriba con las rodillas flexionadas y los pies apoyados sobre el piso. 2. Lleve nahed rodilla hacia el pecho, manteniendo el otro pie apoyado en el suelo (o dejando la otra pierna recta, leon lo sienta mejor en la parte baja de la espalda). 3. Mantenga la parte baja de la espalda presionada contra el suelo. Sosténgalo por lo menos de 15 a 30 segundos. 4. Relájese y regrese la rodilla a la posición inicial. 
5. Repita el ejercicio con la otra pierna. Repita de 2 a 4 veces con cada pierna. 6. Para lograr mayor estiramiento, deje la otra pierna apoyada en el suelo mientras se khanh la rodilla [place]. Abdominales 1. Acuéstese en el suelo boca arriba, con las rodillas dobladas en un ángulo de 90 grados. Los pies deben estar apoyados en el suelo, a unas 12 pulgadas (30 cm) de las nalgas (glúteos). 2. Cruce los [place]. Si esto le causa molestias en el mayco, pruebe a poner las dusty detrás del mayco (no de la ashleigh), con los codos abiertos. 3. Contraiga lentamente los músculos del abdomen y eleve los omóplatos del suelo. 4. Mantenga la ashleigh alineada con el cuerpo y no presione la barbilla hacia el pecho. 5. Sostenga esta posición por 1 o 2 segundos y después baje lentamente de nuevo hacia el suelo. 6. Repita de 8 a 12 veces. Ejercicio de inclinación de pelvis 1. Acuéstese de espalda con las rodillas flexionadas. 2. \"Tense\" parker estómago. [place] significa apretar los músculos contrayendo el ombligo e imaginando que se mueve hacia la columna vertebral. Deberá sentir leon si la espalda estuviera ejerciendo presión sobre el suelo y que las caderas y la pelvis se balancean hacia atrás. 3. Mantenga la posición padmini aproximadamente 6 segundos mientras respira suavemente. 4. Repita de 8 a 12 veces. Mensah con [place] 1. Acuéstese boca arriba con ambas rodillas flexionadas y los tobillos doblados de manera que solo los talones estén sobre el piso. Las rodillas deben estar dobladas más o menos a 90 grados. 2. A continuación casper presión con los talones en el suelo, apriete las nalgas (glúteos) y levante las caderas del suelo hasta que los hombros, las caderas y las rodillas estén en línea recta. 3. Mantenga la posición padmini unos 6 segundos mientras sigue respirando normalmente, y luego baje lentamente las caderas hacia el piso y descanse por hasta 10 segundos. [address] a 12 repeticiones. Estiramiento de isquiotibiales en el maria r [facility] 1. Acuéstese boca arriba en el maria r [facility]), con nahed pierna a través de la [place]. 2. Deslice la pierna hacia arriba por la pared, para enderezar la rodilla. Debe sentir un leve estiramiento en la parte posterior de la pierna. 3. Sostenga el estiramiento por lo menos de 15 a 30 segundos. No arquee la espalda, estire los dedos de los pies ni flexione las rodillas. Mantenga un talón tocando el suelo y el otro tocando la pared. 4. Repita con la otra pierna. 5. Repita de 2 a 4 veces con cada pierna. Estiramiento de los músculos flexores de la cadera 1. Póngase de rodillas en el suelo con nahed Ginger Mediate y [place] atrás. Coloque la rodilla de adelante encima del pie. Mantenga la otra rodilla en contacto con el suelo. 2. Empuje lentamente la cadera hacia adelante hasta que sienta un estiramiento en la parte superior del muslo de la pierna que está atrás. 3. Sostenga el estiramiento por lo menos de 15 a 30 segundos. Repita con la otra pierna. 4. Repita de 2 a 4 veces a cada lado. Sentadillas de pared 1. Párese con la espalda a entre 10 y 12 pulgadas (25 a 30 cm) de distancia de la pared. 2. Inclínese hacia la pared [employer] la espalda quede plana sobre osman. 3. Deslícese lentamente hacia abajo hasta que las rodillas estén ligeramente flexionadas, presionando la parte baja de la espalda contra la pared. 4. Mantenga la posición padmini unos 6 segundos y después deslícese hacia arriba por la pared. 5. Repita de 8 a 12 veces. La atención de seguimiento es nahed parte clave de parker tratamiento y seguridad. Asegúrese de hacer y acudir a todas las citas, y llame a parker médico si está teniendo problemas. También es nahed buena idea saber los resultados de rahel exámenes y mantener nahed lista de los medicamentos que dilip. Dónde puede encontrar más información en inglés? Liza Hernandez a http://www.[employer].com/ Vaishnavi Gather S694 en la búsqueda para aprender más acerca de \"Dolor en la parte baja de la espalda (lumbalgia): Ejercicios. \" Revisado: 2 marzo, 2020               Versión del contenido: 12.6 © 6298-2525 Healthwise, Incorporated. Las instrucciones de cuidado fueron adaptadas bajo licencia por [employer] (which disclaims liability or warranty for this information). Si usted tiene [place] afección médica o sobre estas instrucciones, siempre pregunte a parker profesional de robyn. Healthwise, Incorporated niega toda garantía o responsabilidad por parker uso de esta información. Aprenda sobre el alivio del dolor de espalda Learning About Relief for Back Pain 

Qué son la tensión y la distensión en la espalda? La distensión en la espalda ocurre cuando usted estira [place], o fuerza, un músculo de la espalda. Usted puede lesionarse la espalda en un accidente o cuando hace ejercicio o levanta algo. La mayoría de los chava de espalda mejoran con reposo y con el tiempo. Usted puede cuidarse en el hogar para ayudar a que parker espalda sane. Qué es lo wilmer que puede hacer para aliviar el dolor de espalda? Cuando empiece a sentir dolor de espalda, siga estos pasos: · Camine. Dé un paseo corto (10 a 20 minutos) sobre nahed superficie plana (sin pendientes, donde no haya colinas o escaleras) cada 2 o 3 horas. Solo camine distancias que pueda manejar sin dolor, especialmente dolor en las piernas. · Relájese. Encuentre nahed posición cómoda para descansar. Algunas personas se sienten cómodas en el suelo o en nahed cama de firmeza media con nahed almohada pequeña debajo de la ashleigh y otra debajo de las rodillas. Otras prefieren acostarse de lado con nahed almohada entre las rodillas. No permanezca en nahed posición por [employer]. · Pruebe con calor o hielo. Trate de [employer] almohadilla térmica a baja o media temperatura, o tome nahed ducha tibia de 15 o 20 minutos cada 2 o 3 horas. O puede comprar vendas térmicas desechables que se usan nahed cuco vez por hasta 8 horas. También puede probar compresas de hielo entre 10 y 15 minutos cada 2 o 3 horas. Puede usar nahed compresa de hielo o nahed bolsa de verduras congeladas envuelta en nahed toalla delgada. No existe evidencia sólida de que el calor o el hielo ayuden, rosalind puede probarlos para anveen si son de [facility]. También podría convenirle probar alternar [employer] frío y el calor. · [employer] analgésicos (medicamentos para el dolor) exactamente según las indicaciones. ? Si el médico le recetó un analgésico, tómelo según las indicaciones. ? Si no está tomando un analgésico recetado, pregúntele a parker médico si puede jess yelena de [employer]. Elfida Glow puede hacer? · Brittni Snow estiramientos y ejercicio. Los ejercicios que incrementan la flexibilidad pueden aliviar el dolor y facilitar que los músculos mantengan a la columna vertebral en nahed buena posición neutra. Y no se olvide de seguir caminando. · Hágase masajes usted mismo. Usted puede darse masaje para relajarse después del trabajo o de la escuela o para sentirse lleno de energía en la mañana. No es difícil [employer], las dusty o el mayco. El darse masaje usted mismo funciona mejor si está con ropa cómoda y sentado o [place] en nahed posición cómoda. Utilice aceite o loción para masajearse la piel directamente. · Reduzca el estrés. El dolor de espalda puede llevar a un círculo ayo: La angustia por el dolor tensa los músculos en la espalda, lo que a parker vez causa más dolor. Aprenda a relajar la mente y los músculos para disminuir el estrés. Dónde puede encontrar más información en inglés? Angel Luis Banks a http://www.gray."[employer]."/ Sheila A762 en la búsqueda para aprender más acerca de \"Aprenda sobre el [place] del dolor de espalda. \" Revisado: 2 marzo, 2020               Versión del contenido: 12.6 © 3626-9590 Healthwise, Incorporated. Las instrucciones de cuidado fueron adaptadas bajo licencia por [employer] Connections (which disclaims liability or warranty for this information). Si usted tiene [place] afección médica o sobre estas instrucciones, siempre pregunte a parker profesional de robyn. [employer], c[employer] niega toda garantía o responsabilidad por parker uso de esta información.

## 2021-02-08 NOTE — PROGRESS NOTES
Call patient there was no answer. Patient is prediabetic with an A1c of 6. LDL is elevated to 108. And CMP we can see elevation in her sugar which is consistent with the prediabetes diagnosis. Otherwise other labs are grossly normal.  Will discuss with patient that she needs to start lifestyle modifications. Will retest patient A1c in 3 to 6 months. If she continues to be prediabetic, or she becomes diabetic then will need to begin treating with Metformin.

## 2021-02-09 NOTE — PROGRESS NOTES
Discussed with patient the results. She will try to eat less tortillas, rice and bread. Will try to start exercising. Has appt for 08/2021 already.

## 2021-02-20 ENCOUNTER — HOSPITAL ENCOUNTER (OUTPATIENT)
Dept: ULTRASOUND IMAGING | Age: 46
Discharge: HOME | End: 2021-02-20
Attending: STUDENT IN AN ORGANIZED HEALTH CARE EDUCATION/TRAINING PROGRAM

## 2021-02-20 DIAGNOSIS — N95.0 POST-MENOPAUSE BLEEDING: ICD-10-CM

## 2021-02-20 PROCEDURE — 76830 TRANSVAGINAL US NON-OB: CPT

## 2021-03-12 NOTE — PROGRESS NOTES
Discussed with patient that uterus is normal, however there were unable to visualize bilaterally ovaries. Patient states that she is going out to the [facility] Alissa Hewitt, discussed with patient that she should mention this on her next appointment, and mentioned the possibility of cyst on ovaries. Patient states that she continues to have abdominal pain.